# Patient Record
Sex: MALE | Race: OTHER | NOT HISPANIC OR LATINO | Employment: FULL TIME | URBAN - METROPOLITAN AREA
[De-identification: names, ages, dates, MRNs, and addresses within clinical notes are randomized per-mention and may not be internally consistent; named-entity substitution may affect disease eponyms.]

---

## 2023-06-12 ENCOUNTER — APPOINTMENT (OUTPATIENT)
Dept: URGENT CARE | Facility: MEDICAL CENTER | Age: 53
End: 2023-06-12

## 2023-06-26 ENCOUNTER — APPOINTMENT (OUTPATIENT)
Dept: URGENT CARE | Facility: MEDICAL CENTER | Age: 53
End: 2023-06-26

## 2023-06-27 ENCOUNTER — TELEPHONE (OUTPATIENT)
Dept: OBGYN CLINIC | Facility: MEDICAL CENTER | Age: 53
End: 2023-06-27

## 2023-06-27 NOTE — TELEPHONE ENCOUNTER
Caller: Miguel Lake at [facility]    Doctor: Dennis Torres    Reason for call:     Miguel Lake called to make appt, patient has a NP RT TENDON PROXIMAL HAM STRING TEAR W/C, as looking at   Appointment her call dropped  Dennis Torres has openings on 6/29 , when she calls back      Call back#: n/a

## 2023-06-28 ENCOUNTER — TELEPHONE (OUTPATIENT)
Dept: OBGYN CLINIC | Facility: CLINIC | Age: 53
End: 2023-06-28

## 2023-06-28 ENCOUNTER — OFFICE VISIT (OUTPATIENT)
Dept: OBGYN CLINIC | Facility: CLINIC | Age: 53
End: 2023-06-28
Payer: OTHER MISCELLANEOUS

## 2023-06-28 VITALS
DIASTOLIC BLOOD PRESSURE: 89 MMHG | HEIGHT: 70 IN | WEIGHT: 231.3 LBS | SYSTOLIC BLOOD PRESSURE: 125 MMHG | HEART RATE: 79 BPM | BODY MASS INDEX: 33.11 KG/M2

## 2023-06-28 DIAGNOSIS — S76.319A PARTIAL HAMSTRING TEAR, INITIAL ENCOUNTER: Primary | ICD-10-CM

## 2023-06-28 PROCEDURE — 99204 OFFICE O/P NEW MOD 45 MIN: CPT | Performed by: ORTHOPAEDIC SURGERY

## 2023-06-28 RX ORDER — DICLOFENAC SODIUM 75 MG/1
75 TABLET, DELAYED RELEASE ORAL
COMMUNITY
Start: 2023-06-12

## 2023-06-28 NOTE — TELEPHONE ENCOUNTER
Caller: Saurabh Villalta- Nurse     Doctor: Sharon Lange    Reason for call: Requesting OV note to be faxed over once it is completed and signed    Fax #: [phone]    Call back#: [phone]

## 2023-06-28 NOTE — PROGRESS NOTES
Ortho Sports Medicine New Patient Visit     Assesment:   47 y o  male with proximal hamstring tear     Plan:    Upon examination and review of the right lower leg MRI, we discussed that an isolated tear of 1 of the 3 hamstring tendons does not require surgical intervention  I recommend he start a course of physical therapy and referral was provided  The patient can also use ice/heat, Tylenol, or NSAIDs as needed for pain control  Given the patient's current pain level and being only 3 weeks out from his injury, the patient should be placed on work restrictions, including light duty with no lifting, pushing, pulling, or carrying greater than 5 pounds  A letter was provided with these restrictions today  Further work restrictions will be provided based on the patient's progression with PT as we continue to evaluate him at follow up visits  Follow up:    4 weeks for recheck following PT      Chief Complaint   Patient presents with   • Right Leg - Pain       History of Present Illness: The patient is a 47 y o  male presenting for right thigh pain following a traumatic injury at work 3 weeks ago  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, stating he underestimated the weight of the keg, which caused him to quickly flex at the level of the hip  The patient immediately felt and heard a painful popping sensation in the back of the mid-thigh  He did not note any bruising or muscle deformity following the injury   The       Knee/Hip Surgical History:  None    Past Medical, Social and Family History:  Past Medical History:   Diagnosis Date   • Hypertension      Past Surgical History:   Procedure Laterality Date   • HAND SURGERY Right 2001    plastic bone     No Known Allergies  Current Outpatient Medications on File Prior to Visit   Medication Sig Dispense Refill   • diclofenac (VOLTAREN) 75 mg EC tablet Take 75 mg by mouth 2 (two) times daily after meals (Patient not taking: "Reported on 6/28/2023)       No current facility-administered medications on file prior to visit  Social History     Socioeconomic History   • Marital status: Single     Spouse name: Not on file   • Number of children: Not on file   • Years of education: Not on file   • Highest education level: Not on file   Occupational History   • Not on file   Tobacco Use   • Smoking status: Never   • Smokeless tobacco: Never   Vaping Use   • Vaping Use: Never used   Substance and Sexual Activity   • Alcohol use: Never   • Drug use: Never   • Sexual activity: Not on file   Other Topics Concern   • Not on file   Social History Narrative   • Not on file     Social Determinants of Health     Financial Resource Strain: Not on file   Food Insecurity: Not on file   Transportation Needs: Not on file   Physical Activity: Not on file   Stress: Not on file   Social Connections: Not on file   Intimate Partner Violence: Not on file   Housing Stability: Not on file         I have reviewed the past medical, surgical, social and family history, medications and allergies as documented in the EMR  Review of systems: ROS is negative other than that noted in the HPI  Constitutional: Negative for fatigue and fever  HENT: Negative for sore throat  Respiratory: Negative for shortness of breath  Cardiovascular: Negative for chest pain  Gastrointestinal: Negative for abdominal pain  Endocrine: Negative for cold intolerance and heat intolerance  Genitourinary: Negative for flank pin  Musculoskeletal: Negative for back pain  Skin: Negative for rash  Allergic/Immunologic: Negative for immunocompromised state  Neurological: Negative for dizziness  Psychiatric/Behavioral: Negative for agitation  Physical Exam:    Blood pressure 125/89, pulse 79, height 5' 10\" (1 778 m), weight 105 kg (231 lb 4 8 oz)      General/Constitutional: NAD, well developed, well nourished  HENT: Normocephalic, atraumatic  CV: Intact distal pulses, " regular rate  Resp: No respiratory distress or labored breathing  Abdomen: soft, nondistended   Lymphatic: No lymphadenopathy palpated  Neuro: Alert and Oriented x 3, no focal deficits  Psych: Normal mood, normal affect  Skin: Warm, dry, no rashes, no erythema      Knee/Hip Exam:   No significant skin lesions, echhymosis or deformity  No changes in muscle bulk  Palpation: mild tenderness with deep palpation about 5 cm distal to the gluteal cleft   Knee range of motion from full extension to 130 without pain  5/5 knee extension and 4+/5 with resisted knee flexion with muscle cramping sensation  5/5 ankle DF/PF  Able to actively extend the hip and 5/5 hip extension   Neurovascularly intact distally    Imaging    MRI of the right lower leg reviewed from 6/17/2023, which demonstrates Grade 3 full-thickness biceps femoris tear retracted approximately 2 5 cm    Other 2 hamstring tendons remain intact

## 2023-06-28 NOTE — LETTER
June 28, 2023     Patient: Oma Thompson  YOB: 1970  Date of Visit: 6/28/2023      To Whom it May Concern:    Joe Cruz is under my professional care  Solange Jordan was seen in my office on 6/28/2023  Solange Jordan can work on [employer] duty only, including no lifting, pushing, pulling, or carrying greater than 5 pounds  The patient should attend physical therapy per a provided protocol, which includes ideally 1-3 sessions per week  Further work restrictions will be provided at his follow up visit in 4 weeks  If you have any questions or concerns, please don't hesitate to call           Sincerely,          Abbi Madrigal MD

## 2023-06-29 NOTE — TELEPHONE ENCOUNTER
Caller: Jesica Riddle - Nurse     Doctor: Isidra Donald    Reason for call: Calling to request Wang  to

## 2023-07-26 ENCOUNTER — OFFICE VISIT (OUTPATIENT)
Dept: OBGYN CLINIC | Facility: CLINIC | Age: 53
End: 2023-07-26
Payer: OTHER MISCELLANEOUS

## 2023-07-26 VITALS
HEIGHT: 70 IN | SYSTOLIC BLOOD PRESSURE: 124 MMHG | BODY MASS INDEX: 32.93 KG/M2 | DIASTOLIC BLOOD PRESSURE: 86 MMHG | WEIGHT: 230 LBS | HEART RATE: 70 BPM

## 2023-07-26 DIAGNOSIS — S76.319A PARTIAL HAMSTRING TEAR, INITIAL ENCOUNTER: Primary | ICD-10-CM

## 2023-07-26 PROCEDURE — 99213 OFFICE O/P EST LOW 20 MIN: CPT | Performed by: ORTHOPAEDIC SURGERY

## 2023-07-26 RX ORDER — LISINOPRIL 20 MG/1
20 TABLET ORAL DAILY
COMMUNITY

## 2023-07-26 RX ORDER — AMLODIPINE BESYLATE 5 MG/1
5 TABLET ORAL DAILY
COMMUNITY

## 2023-07-26 NOTE — PROGRESS NOTES
Ortho Sports Medicine New Patient Visit     Assesment:   47 y.o. male with proximal hamstring tear     Plan:    Upon examination today, I believe the patient continues to make appropriate symptomatic improvement with non-operative treatment, including consistent PT and OTC medications as needed. We discussed that rehab for hamstring tears should not progress too quickly to prevent delayed healing or worsening injury. As such, PT can continue progressing his hamstring strength and begin focusing on work hardening over the next 4 weeks. We can increase the patient's work restrictions to no lifting, pushing, pulling, or carrying greater than 20 pounds. A letter was provided with these restrictions today. Further work restrictions will be provided based on the patient's progression with PT as we continue to evaluate him at follow up visits. The patient can also use ice/heat, Tylenol, or NSAIDs as needed for pain control. Regarding his muscle cramps, this may be related to dehydration, electrolyte deficiencies, or muscle deconditioning among other things. We will continue to monitor these symptoms and I anticipate they will continue to improve as he regains strength in his leg. Follow up:    4 weeks for recheck following PT      Chief Complaint   Patient presents with   • Right Lower Leg - Follow-up       History of Present Illness: The patient is a 47 y.o. male presenting for follow up of right proximal hamstring tear following 1 month of consistent PT and supplementing with exercises at home. The injury occurred about 7 weeks ago. His  is present with him today. Carmenza Gonzales reports his pain continues to improve, but reports muscle cramping and "rusty horse" sensations in the posterior mid-thigh and calf following PT sessions and with certain motions, specifically squatting. Pain is alleviated by stretching and rest. The patient reports he stays well-hydrated.  The patient is using OTC medications as needed. 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 He does not note any bruising, muscle deformity, weakness, calf pain, or numbness/tingling. The patient has been out of work since his last visit. Knee/Hip Surgical History:  None    Past Medical, Social and Family History:  Past Medical History:   Diagnosis Date   • Hypertension      Past Surgical History:   Procedure Laterality Date   • HAND SURGERY Right 2001    plastic bone     No Known Allergies  Current Outpatient Medications on File Prior to Visit   Medication Sig Dispense Refill   • amLODIPine (NORVASC) 5 mg tablet Take 5 mg by mouth daily     • lisinopril (ZESTRIL) 20 mg tablet Take 20 mg by mouth daily     • diclofenac (VOLTAREN) 75 mg EC tablet Take 75 mg by mouth 2 (two) times daily after meals (Patient not taking: Reported on 6/28/2023)       No current facility-administered medications on file prior to visit. Social History     Socioeconomic History   • Marital status: Single     Spouse name: Not on file   • Number of children: Not on file   • Years of education: Not on file   • Highest education level: Not on file   Occupational History   • Not on file   Tobacco Use   • Smoking status: Never   • Smokeless tobacco: Never   Vaping Use   • Vaping Use: Never used   Substance and Sexual Activity   • Alcohol use: Never   • Drug use: Never   • Sexual activity: Not on file   Other Topics Concern   • Not on file   Social History Narrative   • Not on file     Social Determinants of Health     Financial Resource Strain: Not on file   Food Insecurity: Not on file   Transportation Needs: Not on file   Physical Activity: Not on file   Stress: Not on file   Social Connections: Not on file   Intimate Partner Violence: Not on file   Housing Stability: Not on file         I have reviewed the past medical, surgical, social and family history, medications and allergies as documented in the EMR.     Review of systems: ROS is negative other than that noted in the HPI. Constitutional: Negative for fatigue and fever. HENT: Negative for sore throat. Respiratory: Negative for shortness of breath. Cardiovascular: Negative for chest pain. Gastrointestinal: Negative for abdominal pain. Endocrine: Negative for cold intolerance and heat intolerance. Genitourinary: Negative for flank pin. Musculoskeletal: Negative for back pain. Skin: Negative for rash. Allergic/Immunologic: Negative for immunocompromised state. Neurological: Negative for dizziness. Psychiatric/Behavioral: Negative for agitation. Physical Exam:    Blood pressure 124/86, pulse 70, height 5' 10" (1.778 m), weight 104 kg (230 lb). General/Constitutional: NAD, well developed, well nourished  HENT: Normocephalic, atraumatic  CV: Intact distal pulses, regular rate  Resp: No respiratory distress or labored breathing  Abdomen: soft, nondistended   Lymphatic: No lymphadenopathy palpated  Neuro: Alert and Oriented x 3, no focal deficits  Psych: Normal mood, normal affect  Skin: Warm, dry, no rashes, no erythema      Knee/Hip Exam:   No significant skin lesions, ecchymosis, or deformity  No changes in muscle bulk  Palpation: mild tenderness with deep palpation about 6 inches distal to the gluteal cleft   Knee range of motion from full extension to 130 without pain  5/5 knee extension/flexion with muscle cramping sensation  5/5 ankle DF/PF  Able to actively extend the hip and 5/5 hip extension   Negative Homans  Neurovascularly intact distally    Imaging    MRI of the right lower leg reviewed from 6/17/2023, which demonstrates Grade 3 full-thickness biceps femoris tear retracted approximately 2.5 cm.  Other 2 hamstring tendons remain intact

## 2023-07-26 NOTE — LETTER
July 26, 2023     Patient: Malgorzata Jorge  YOB: 1970  Date of Visit: 7/26/2023      To Whom it May Concern:    Mellissa Ragsdale is under my professional care. Lv Grossman was seen in my office on 7/26/2023. Lv Grossman should continue light duty only with no lifting, carrying, pushing, or pulling greater than 20 pounds. Further work restrictions will be provided at his follow up visit with his Orthopedic Surgeon in 1 month. If you have any questions or concerns, please don't hesitate to call.          Sincerely,          Allyssa Francisco MD        CC: No Recipients

## 2023-07-28 ENCOUNTER — TELEPHONE (OUTPATIENT)
Dept: OBGYN CLINIC | Facility: HOSPITAL | Age: 53
End: 2023-07-28

## 2023-07-28 NOTE — TELEPHONE ENCOUNTER
Caller: [facility]    Doctor/Office: Raj HIGHTOWER#:       What needs to be faxed: OV 7/26/23    ATTN to: Jimmie Jaimes.     Fax#: [phone]      Documents were successfully e-faxed

## 2023-09-05 ENCOUNTER — HOSPITAL ENCOUNTER (EMERGENCY)
Facility: HOSPITAL | Age: 53
Discharge: HOME | End: 2023-09-05
Attending: EMERGENCY MEDICINE
Payer: COMMERCIAL

## 2023-09-05 VITALS
DIASTOLIC BLOOD PRESSURE: 94 MMHG | SYSTOLIC BLOOD PRESSURE: 154 MMHG | HEART RATE: 69 BPM | RESPIRATION RATE: 18 BRPM | TEMPERATURE: 98.4 F | OXYGEN SATURATION: 96 %

## 2023-09-05 DIAGNOSIS — E11.9 NEW ONSET TYPE 2 DIABETES MELLITUS (HCC): Primary | ICD-10-CM

## 2023-09-05 LAB
ANION GAP SERPL CALCULATED.3IONS-SCNC: 10 MMOL/L
BASOPHILS # BLD AUTO: 0.07 THOUSANDS/ÂΜL (ref 0–0.1)
BASOPHILS NFR BLD AUTO: 2 % (ref 0–1)
BUN SERPL-MCNC: 13 MG/DL (ref 5–25)
CALCIUM SERPL-MCNC: 9.7 MG/DL (ref 8.4–10.2)
CHLORIDE SERPL-SCNC: 102 MMOL/L (ref 96–108)
CO2 SERPL-SCNC: 24 MMOL/L (ref 21–32)
CREAT SERPL-MCNC: 1.34 MG/DL (ref 0.6–1.3)
EOSINOPHIL # BLD AUTO: 0.24 THOUSAND/ÂΜL (ref 0–0.61)
EOSINOPHIL NFR BLD AUTO: 5 % (ref 0–6)
ERYTHROCYTE [DISTWIDTH] IN BLOOD BY AUTOMATED COUNT: 14.9 % (ref 11.6–15.1)
GFR SERPL CREATININE-BSD FRML MDRD: 60 ML/MIN/1.73SQ M
GLUCOSE SERPL-MCNC: 242 MG/DL (ref 65–140)
HCT VFR BLD AUTO: 45.4 % (ref 36.5–49.3)
HGB BLD-MCNC: 14.8 G/DL (ref 12–17)
IMM GRANULOCYTES # BLD AUTO: 0.01 THOUSAND/UL (ref 0–0.2)
IMM GRANULOCYTES NFR BLD AUTO: 0 % (ref 0–2)
LYMPHOCYTES # BLD AUTO: 1.97 THOUSANDS/ÂΜL (ref 0.6–4.47)
LYMPHOCYTES NFR BLD AUTO: 42 % (ref 14–44)
MCH RBC QN AUTO: 24.5 PG (ref 26.8–34.3)
MCHC RBC AUTO-ENTMCNC: 32.6 G/DL (ref 31.4–37.4)
MCV RBC AUTO: 75 FL (ref 82–98)
MONOCYTES # BLD AUTO: 0.39 THOUSAND/ÂΜL (ref 0.17–1.22)
MONOCYTES NFR BLD AUTO: 8 % (ref 4–12)
NEUTROPHILS # BLD AUTO: 2.04 THOUSANDS/ÂΜL (ref 1.85–7.62)
NEUTS SEG NFR BLD AUTO: 43 % (ref 43–75)
NRBC BLD AUTO-RTO: 0 /100 WBCS
PLATELET # BLD AUTO: 258 THOUSANDS/UL (ref 149–390)
PMV BLD AUTO: 10.1 FL (ref 8.9–12.7)
POTASSIUM SERPL-SCNC: 4 MMOL/L (ref 3.5–5.3)
RBC # BLD AUTO: 6.04 MILLION/UL (ref 3.88–5.62)
SODIUM SERPL-SCNC: 136 MMOL/L (ref 135–147)
WBC # BLD AUTO: 4.72 THOUSAND/UL (ref 4.31–10.16)

## 2023-09-05 PROCEDURE — 99284 EMERGENCY DEPT VISIT MOD MDM: CPT | Performed by: EMERGENCY MEDICINE

## 2023-09-05 PROCEDURE — 80048 BASIC METABOLIC PNL TOTAL CA: CPT | Performed by: EMERGENCY MEDICINE

## 2023-09-05 PROCEDURE — 36415 COLL VENOUS BLD VENIPUNCTURE: CPT | Performed by: EMERGENCY MEDICINE

## 2023-09-05 PROCEDURE — 83036 HEMOGLOBIN GLYCOSYLATED A1C: CPT | Performed by: EMERGENCY MEDICINE

## 2023-09-05 PROCEDURE — 85025 COMPLETE CBC W/AUTO DIFF WBC: CPT | Performed by: EMERGENCY MEDICINE

## 2023-09-05 PROCEDURE — 99283 EMERGENCY DEPT VISIT LOW MDM: CPT

## 2023-09-05 NOTE — ED PROVIDER NOTES
History  Chief Complaint   Patient presents with   • Abnormal Lab     Patient sent in for lab screening      20-year-old male with history of hypertension on amlodipine and lisinopril presents for lab screening. 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 Patient has no previous diagnosis of diabetes. He was sent for further testing to determine if he is diabetic in order to complete his DOT license renewal.  Patient denies any acute symptoms. He has been feeling well recently. He has been taking his blood pressure medications as prescribed. He needs a new PCP to follow-up on his chronic medical screenings and was provided with a list of options. Prior to Admission Medications   Prescriptions Last Dose Informant Patient Reported? Taking? amLODIPine (NORVASC) 5 mg tablet   Yes No   Sig: Take 5 mg by mouth daily   diclofenac (VOLTAREN) 75 mg EC tablet   Yes No   Sig: Take 75 mg by mouth 2 (two) times daily after meals   Patient not taking: Reported on 6/28/2023   lisinopril (ZESTRIL) 20 mg tablet   Yes No   Sig: Take 20 mg by mouth daily      Facility-Administered Medications: None       Past Medical History:   Diagnosis Date   • Hypertension        Past Surgical History:   Procedure Laterality Date   • HAND SURGERY Right 2001    plastic bone       History reviewed. No pertinent family history. I have reviewed and agree with the history as documented. E-Cigarette/Vaping   • E-Cigarette Use Never User      E-Cigarette/Vaping Substances   • Nicotine No    • THC No    • CBD No    • Flavoring No    • Other No    • Unknown No      Social History     Tobacco Use   • Smoking status: Never   • Smokeless tobacco: Never   Vaping Use   • Vaping Use: Never used   Substance Use Topics   • Alcohol use: Never   • Drug use: Never       Review of Systems   All other systems reviewed and are negative.       Physical Exam  Physical Exam  Constitutional:       General: He is not in acute distress. Appearance: Normal appearance. HENT:      Head: Normocephalic. Mouth/Throat:      Mouth: Mucous membranes are moist.   Eyes:      Conjunctiva/sclera: Conjunctivae normal.   Cardiovascular:      Rate and Rhythm: Normal rate. Pulmonary:      Effort: Pulmonary effort is normal.   Musculoskeletal:         General: Normal range of motion. Cervical back: Normal range of motion. Skin:     General: Skin is dry. Neurological:      General: No focal deficit present. Mental Status: He is alert. Psychiatric:         Mood and Affect: Mood normal.         Behavior: Behavior normal.         Vital Signs  ED Triage Vitals [09/05/23 1541]   Temperature Pulse Respirations Blood Pressure SpO2   98.4 °F (36.9 °C) 69 18 154/94 96 %      Temp Source Heart Rate Source Patient Position - Orthostatic VS BP Location FiO2 (%)   Oral Monitor Sitting Left arm --      Pain Score       --           Vitals:    09/05/23 1541   BP: 154/94   Pulse: 69   Patient Position - Orthostatic VS: Sitting         Visual Acuity      ED Medications  Medications - No data to display    Diagnostic Studies  Results Reviewed     Procedure Component Value Units Date/Time    CBC and differential [[id number]] Collected: 09/05/23 1602    Lab Status: No result Specimen: Blood from Arm, Left     Basic metabolic panel [[id number]] Collected: 09/05/23 1602    Lab Status: No result Specimen: Blood from Arm, Left     Hemoglobin A1C [[id number]] Collected: 09/05/23 1602    Lab Status: No result Specimen: Blood from Arm, Left                  No orders to display              Procedures  Procedures         ED Course       60-year-old male presenting for evaluation of glycosuria on DOT physical.  He is well-appearing with normal vitals.   Will obtain CBC BMP and hemoglobin A1c to determine whether patient may have developed diabetes which is currently asymptomatic. SBIRT 20yo+    Flowsheet Row Most Recent Value   Initial Alcohol Screen: US AUDIT-C     1. How often do you have a drink containing alcohol? 0 Filed at: 09/05/2023 1547   2. How many drinks containing alcohol do you have on a typical day you are drinking? 0 Filed at: 09/05/2023 1547   3a. Male UNDER 65: How often do you have five or more drinks on one occasion? 0 Filed at: 09/05/2023 1547   3b. FEMALE Any Age, or MALE 65+: How often do you have 4 or more drinks on one occassion? 0 Filed at: 09/05/2023 1547   Audit-C Score 0 Filed at: 09/05/2023 1547   DAVE: How many times in the past year have you. .. Used an illegal drug or used a prescription medication for non-medical reasons? Never Filed at: 09/05/2023 1547                    MDM    Disposition  Final diagnoses:   None     ED Disposition     None      Follow-up Information    None         Patient's Medications   Discharge Prescriptions    No medications on file       No discharge procedures on file.     PDMP Review     None          ED Provider  Electronically Signed by           Francie Moise MD  09/06/23 5350

## 2023-09-05 NOTE — ED RE-EVALUATION NOTE
I received sign-out on the patient at change of shift. 1. New onset type 2 diabetes mellitus [facility])      ED Course as of 09/05/23 2321   Tue Sep 05, 2023   1658 Case signed out to me at change of shift pending A1C     1812 Patient not willing to wait for A1C but does have glucosuria and random glucose of 242. Will start metformin while awaiting PCP follow-up.          Disposition: Discharged in stable condition       Vicenta Hicks MD  09/05/23 6663

## 2023-09-06 LAB
EST. AVERAGE GLUCOSE BLD GHB EST-MCNC: 169 MG/DL
HBA1C MFR BLD: 7.5 %

## 2023-09-08 ENCOUNTER — TELEPHONE (OUTPATIENT)
Age: 53
End: 2023-09-08

## 2023-09-08 NOTE — TELEPHONE ENCOUNTER
Caller: Rebeca-    Doctor: Dr. Lindsey Araiza    Reason for call: Rebeca-nurse  asking if there is anyway patient can be squeezed in this afternoon. She is outside the office and was to be advised of any change in appt status. Advised Dr. Lindsey Araiza fully booked. Called office and they were able to squeeze patient in at 2:15PM today.  had me call to leave message with patient. VM left.  asking if it was possible to reserve 2:15PM on 9/8/23 and 9/11/23 appt at 5:45PM advised that we cannot hold the 2 appts.   Per past schedule, patient cancelled the 9/8/23 appt on 9/7/23 at 3:16PM.       Call back#: [phone]

## 2023-09-11 ENCOUNTER — OFFICE VISIT (OUTPATIENT)
Dept: OBGYN CLINIC | Facility: CLINIC | Age: 53
End: 2023-09-11
Payer: OTHER MISCELLANEOUS

## 2023-09-11 VITALS
WEIGHT: 227 LBS | DIASTOLIC BLOOD PRESSURE: 81 MMHG | HEIGHT: 70 IN | HEART RATE: 71 BPM | SYSTOLIC BLOOD PRESSURE: 122 MMHG | BODY MASS INDEX: 32.5 KG/M2

## 2023-09-11 DIAGNOSIS — S76.311S PARTIAL HAMSTRING TEAR, RIGHT, SEQUELA: Primary | ICD-10-CM

## 2023-09-11 PROCEDURE — 99212 OFFICE O/P EST SF 10 MIN: CPT | Performed by: ORTHOPAEDIC SURGERY

## 2023-09-11 NOTE — LETTER
September 11, 2023     Patient: Bharathi Mehta  YOB: 1970  Date of Visit: 9/11/2023      To Whom it May Concern:    Iqra Rodriguez is under my professional care. Talavera Bosworth was seen in my office on 9/11/2023. Fletcher Bosworth may return to full duty without any limitations. If you have any questions or concerns, please don't hesitate to call.          Sincerely,          Nataliia Dennis MD        CC: No Recipients

## 2023-09-11 NOTE — PROGRESS NOTES
Ortho Sports Medicine New Patient Visit     Assesment:   47 y.o. male with right proximal hamstring tear     Plan:    Upon examination today, I believe the patient has made significant progress with non-operative treatment and is clear to return to full duty and activity as tolerated without any specific limitations at this time. The patient can continue using ice/heat, Tylenol, or NSAIDs as needed. He can plan to follow up as needed. A letter was provided for full duty today. Follow up as needed in the future. History of Present Illness: The patient is a 47 y.o. male presenting for follow up of right proximal hamstring tear about 13.5 weeks ago. He has done an additional 1.5 months of consistent PT and performing home exercises. He states the work hardening helped his progress and he has returned to working out on his own without any difficulties. Harvinder Gray denies any pain, muscle deformity, weakness, or numbness/tingling. He started a new job as a  and has been working without any issues. Knee/Hip Surgical History:  None    Past Medical, Social and Family History:  Past Medical History:   Diagnosis Date   • Hypertension      Past Surgical History:   Procedure Laterality Date   • HAND SURGERY Right 2001    plastic bone     No Known Allergies  Current Outpatient Medications on File Prior to Visit   Medication Sig Dispense Refill   • amLODIPine (NORVASC) 5 mg tablet Take 5 mg by mouth daily     • lisinopril (ZESTRIL) 20 mg tablet Take 20 mg by mouth daily     • metFORMIN (GLUCOPHAGE) 500 mg tablet Take 1 tablet (500 mg total) by mouth 2 (two) times a day with meals for 14 days 28 tablet 0   • diclofenac (VOLTAREN) 75 mg EC tablet Take 75 mg by mouth 2 (two) times daily after meals (Patient not taking: Reported on 6/28/2023)       No current facility-administered medications on file prior to visit.      Social History     Socioeconomic History   • Marital status: Single     Spouse name: Not on file   • Number of children: Not on file   • Years of education: Not on file   • Highest education level: Not on file   Occupational History   • Not on file   Tobacco Use   • Smoking status: Never   • Smokeless tobacco: Never   Vaping Use   • Vaping Use: Never used   Substance and Sexual Activity   • Alcohol use: Never   • Drug use: Never   • Sexual activity: Not on file   Other Topics Concern   • Not on file   Social History Narrative   • Not on file     Social Determinants of Health     Financial Resource Strain: Not on file   Food Insecurity: Not on file   Transportation Needs: Not on file   Physical Activity: Not on file   Stress: Not on file   Social Connections: Not on file   Intimate Partner Violence: Not on file   Housing Stability: Not on file         I have reviewed the past medical, surgical, social and family history, medications and allergies as documented in the EMR. Review of systems: ROS is negative other than that noted in the HPI. Constitutional: Negative for fatigue and fever. HENT: Negative for sore throat. Respiratory: Negative for shortness of breath. Cardiovascular: Negative for chest pain. Gastrointestinal: Negative for abdominal pain. Endocrine: Negative for cold intolerance and heat intolerance. Genitourinary: Negative for flank pin. Musculoskeletal: Negative for back pain. Skin: Negative for rash. Allergic/Immunologic: Negative for immunocompromised state. Neurological: Negative for dizziness. Psychiatric/Behavioral: Negative for agitation. Physical Exam:    Blood pressure 122/81, pulse 71, height 5' 10" (1.778 m), weight 103 kg (227 lb).     General/Constitutional: NAD, well developed, well nourished  HENT: Normocephalic, atraumatic  CV: Intact distal pulses, regular rate  Resp: No respiratory distress or labored breathing  Abdomen: soft, nondistended   Lymphatic: No lymphadenopathy palpated  Neuro: Alert and Oriented x 3, no focal deficits  Psych: Normal mood, normal affect  Skin: Warm, dry, no rashes, no erythema      Knee/Hip Exam:   No significant skin lesions, ecchymosis, or deformity  No changes in muscle bulk  Palpation: no tenderness with deep palpation along the hamstrings  Knee range of motion from full extension to 130 without pain  5/5 knee extension/flexion with muscle cramping sensation  Able to actively extend the hip and 5/5 hip extension   Able to deep squat without any pain or limitations  Neurovascularly intact distally    Imaging    MRI of the right lower leg reviewed from 6/17/2023, which demonstrates Grade 3 full-thickness biceps femoris tear retracted approximately 2.5 cm. Other 2 hamstring tendons remain intact.

## 2023-09-12 ENCOUNTER — TELEPHONE (OUTPATIENT)
Age: 53
End: 2023-09-12

## 2023-09-12 NOTE — TELEPHONE ENCOUNTER
Caller: [facility] w/ Work Comp     Doctor: Thien Araiza     Reason for call: Please fax  office note to her once signed to : [phone].      Call back#: [phone]

## 2023-12-14 ENCOUNTER — OFFICE VISIT (OUTPATIENT)
Dept: INTERNAL MEDICINE CLINIC | Facility: CLINIC | Age: 53
End: 2023-12-14
Payer: COMMERCIAL

## 2023-12-14 VITALS
HEIGHT: 71 IN | TEMPERATURE: 97.4 F | BODY MASS INDEX: 32.62 KG/M2 | HEART RATE: 91 BPM | WEIGHT: 233 LBS | OXYGEN SATURATION: 99 % | RESPIRATION RATE: 20 BRPM | SYSTOLIC BLOOD PRESSURE: 120 MMHG | DIASTOLIC BLOOD PRESSURE: 92 MMHG

## 2023-12-14 DIAGNOSIS — Z76.89 ENCOUNTER TO ESTABLISH CARE: Primary | ICD-10-CM

## 2023-12-14 DIAGNOSIS — E11.9 TYPE 2 DIABETES MELLITUS WITHOUT COMPLICATION, WITHOUT LONG-TERM CURRENT USE OF INSULIN (HCC): ICD-10-CM

## 2023-12-14 DIAGNOSIS — I10 HYPERTENSION, UNSPECIFIED TYPE: ICD-10-CM

## 2023-12-14 PROCEDURE — 99203 OFFICE O/P NEW LOW 30 MIN: CPT

## 2023-12-14 NOTE — ASSESSMENT & PLAN NOTE
Blood Pressure: 120/92   Maintained on lisinopril 20 mg and amlodipine 5 mg daily  Exercise and low salt diet emphasized

## 2023-12-14 NOTE — ASSESSMENT & PLAN NOTE
Lab Results   Component Value Date    HGBA1C 7.5 (H) 09/05/2023   Previously on short course of metformin 500 mg bid  Dose increased to 1000 mg bid this visit  Diabetic foot exam wnl  Patient educated about diabetic diet and exercise

## 2023-12-14 NOTE — PROGRESS NOTES
Name: Bravo Bragg      : 1970      MRN: [id number]  Encounter Provider: Olivia Coffey MD  Encounter Date: 2023   Encounter department: [address] [place] [address]     1. Encounter to establish care  -     Albumin / creatinine urine ratio; Future; Expected date: 2023  -     Comprehensive metabolic panel; Future; Expected date: 2023  -     Hemoglobin A1C; Future; Expected date: 2023  -     TSH, 3rd generation with Free T4 reflex; Future  -     Ambulatory Referral to Gastroenterology; Future  -     PSA Total (Reflex To Free); Future  -     Lipid Panel with Direct LDL reflex; Future  -     Ambulatory Referral to Ophthalmology; Future  -     metFORMIN (GLUCOPHAGE) 1000 MG tablet; Take 1 tablet (1,000 mg total) by mouth 2 (two) times a day with meals    2. Type 2 diabetes mellitus without complication, without long-term current use of insulin (HCC)  Assessment & Plan:    Lab Results   Component Value Date    HGBA1C 7.5 (H) 2023   Previously on short course of metformin 500 mg bid  Dose increased to 1000 mg bid this visit  Diabetic foot exam wnl  Patient educated about diabetic diet and exercise      Orders:  -     Albumin / creatinine urine ratio; Future; Expected date: 2023  -     Comprehensive metabolic panel; Future; Expected date: 2023  -     Hemoglobin A1C; Future; Expected date: 2023  -     Lipid Panel with Direct LDL reflex; Future  -     Ambulatory Referral to Ophthalmology; Future  -     metFORMIN (GLUCOPHAGE) 1000 MG tablet; Take 1 tablet (1,000 mg total) by mouth 2 (two) times a day with meals    3. Hypertension, unspecified type  Assessment & Plan:  Blood Pressure: 120/92   Maintained on lisinopril 20 mg and amlodipine 5 mg daily  Exercise and low salt diet emphasized    Orders:  -     Comprehensive metabolic panel;  Future; Expected date: 2023        Depression Screening and Follow-up Plan: Patient was screened for depression during today's encounter. They screened negative with a PHQ-2 score of 0. Subjective      Patient is a 47year old male with a prior history of hypertension and type II diabetes. He is here to establish care with a PCP today for further management of his chronic issues. He is a  and is seeking to get his certification renewed. Social history is negative for tobacco, alcohol or illicit drug use. There is no prior family history of heart disease, stroke, or cancer. He is  and sexually active with his wife. He served a 5 year sentence in [employer] and was released just over a year ago. He was screened for hep c and HIV at that time which he reports was negative. Patient reports being up to date on tetanus and covid shots but is declining the flu vaccine at this visit. Patient recently had A1c done in emergency department which showed him to be in diabetic range. He was prescribed a short course of low dose metformin at that time but stopped taking it after the prescription wore off. He did not have a PCP at that time and is currently in between insurance policies. Patient is aware of his diabetes diagnosis and tries hard to take care of himself. He exercises regularly and he says his wife cooks for him. Patient endorses eating steamed vegetables often with his meals. Patient endorses feeling generally well today and has no complaints at this visit. Review of Systems   Constitutional:  Negative for chills and fever. HENT:  Negative for congestion, hearing loss, rhinorrhea, sore throat and trouble swallowing. Eyes:  Negative for pain and visual disturbance. Respiratory:  Negative for cough and shortness of breath. Cardiovascular:  Negative for chest pain, palpitations and leg swelling. Gastrointestinal:  Negative for abdominal distention, abdominal pain, blood in stool, constipation, diarrhea, nausea and vomiting.    Genitourinary:  Negative for difficulty urinating, dysuria and frequency. Musculoskeletal:  Negative for back pain and neck pain. Skin:  Negative for color change and rash. Neurological:  Negative for dizziness, speech difficulty, weakness, numbness and headaches. Psychiatric/Behavioral:  Negative for agitation, behavioral problems and dysphoric mood. The patient is not nervous/anxious. Current Outpatient Medications on File Prior to Visit   Medication Sig    amLODIPine (NORVASC) 5 mg tablet Take 5 mg by mouth daily (Patient not taking: Reported on 12/14/2023)    diclofenac (VOLTAREN) 75 mg EC tablet Take 75 mg by mouth 2 (two) times daily after meals (Patient not taking: Reported on 6/28/2023)    lisinopril (ZESTRIL) 20 mg tablet Take 20 mg by mouth daily (Patient not taking: Reported on 12/14/2023)    [DISCONTINUED] metFORMIN (GLUCOPHAGE) 500 mg tablet Take 1 tablet (500 mg total) by mouth 2 (two) times a day with meals for 14 days (Patient not taking: Reported on 12/14/2023)       Objective     /92 (BP Location: Left arm, Patient Position: Sitting, Cuff Size: Large)   Pulse 91   Temp (!) 97.4 °F (36.3 °C)   Resp 20   Ht 5' 10.5" (1.791 m)   Wt 106 kg (233 lb)   SpO2 99%   BMI 32.96 kg/m²     Physical Exam  Vitals and nursing note reviewed. Constitutional:       General: He is not in acute distress. Appearance: Normal appearance. He is not ill-appearing. HENT:      Head: Normocephalic and atraumatic. Nose: Nose normal.      Mouth/Throat:      Mouth: Mucous membranes are moist.      Pharynx: Oropharynx is clear. No oropharyngeal exudate. Eyes:      General: No scleral icterus. Right eye: No discharge. Conjunctiva/sclera: Conjunctivae normal.      Pupils: Pupils are equal, round, and reactive to light. Cardiovascular:      Rate and Rhythm: Normal rate and regular rhythm. Pulses: Normal pulses.  no weak pulses          Dorsalis pedis pulses are 2+ on the right side and 2+ on the left side.      Heart sounds: Normal heart sounds. Pulmonary:      Effort: Pulmonary effort is normal.      Breath sounds: Normal breath sounds. Abdominal:      General: Abdomen is flat. Bowel sounds are normal.      Palpations: Abdomen is soft. Musculoskeletal:         General: Normal range of motion. Cervical back: Normal range of motion. Right lower leg: No edema. Left lower leg: No edema. Feet:      Right foot:      Skin integrity: No ulcer, skin breakdown, erythema, warmth, callus or dry skin. Left foot:      Skin integrity: No ulcer, skin breakdown, erythema, warmth, callus or dry skin. Skin:     General: Skin is warm and dry. Neurological:      General: No focal deficit present. Mental Status: He is alert and oriented to person, place, and time. Cranial Nerves: No cranial nerve deficit. Psychiatric:         Mood and Affect: Mood normal.         Behavior: Behavior normal.     Diabetic Foot Exam    Patient's shoes and socks removed. Right Foot/Ankle   Right Foot Inspection  Skin Exam: skin normal. Skin not intact, no dry skin, no warmth, no callus, no erythema, no maceration, no abnormal color, no pre-ulcer, no ulcer and no callus. Toe Exam: ROM and strength within normal limits. No swelling, no tenderness, erythema and  no right toe deformity    Sensory   Monofilament testing: intact    Vascular  Capillary refills: < 3 seconds  The right DP pulse is 2+. Left Foot/Ankle  Left Foot Inspection  Skin Exam: skin normal. Skin not intact, no dry skin, no warmth, no erythema, no maceration, normal color, no pre-ulcer, no ulcer and no callus. Toe Exam: ROM and strength within normal limits. No swelling, no tenderness, no erythema and no left toe deformity. Sensory   Monofilament testing: intact    Vascular  Capillary refills: < 3 seconds  The left DP pulse is 2+.      Assign Risk Category  No deformity present  No loss of protective sensation  No weak pulses  Risk: 0    Hernán Zapien MD

## 2023-12-15 ENCOUNTER — APPOINTMENT (OUTPATIENT)
Dept: LAB | Facility: HOSPITAL | Age: 53
End: 2023-12-15

## 2023-12-15 DIAGNOSIS — E11.9 TYPE 2 DIABETES MELLITUS WITHOUT COMPLICATION, WITHOUT LONG-TERM CURRENT USE OF INSULIN (HCC): Primary | ICD-10-CM

## 2023-12-15 DIAGNOSIS — E11.9 TYPE 2 DIABETES MELLITUS WITHOUT COMPLICATION, WITHOUT LONG-TERM CURRENT USE OF INSULIN (HCC): ICD-10-CM

## 2023-12-15 DIAGNOSIS — Z76.89 ENCOUNTER TO ESTABLISH CARE: ICD-10-CM

## 2023-12-15 LAB
EST. AVERAGE GLUCOSE BLD GHB EST-MCNC: 209 MG/DL
HBA1C MFR BLD: 8.9 %

## 2023-12-15 PROCEDURE — 83036 HEMOGLOBIN GLYCOSYLATED A1C: CPT

## 2023-12-15 PROCEDURE — 36415 COLL VENOUS BLD VENIPUNCTURE: CPT

## 2023-12-19 ENCOUNTER — TELEPHONE (OUTPATIENT)
Dept: INTERNAL MEDICINE CLINIC | Facility: CLINIC | Age: 53
End: 2023-12-19

## 2023-12-19 NOTE — TELEPHONE ENCOUNTER
Patient was not able to afford new medication ordered. Will continue the Metformin unless advised differently or other less expensive med is ordered.

## 2023-12-19 NOTE — TELEPHONE ENCOUNTER
Patient has metformin and he scheduled an appointment in January since he is going to have insurance but we will need to verify his insurance is accepted by our office

## 2024-01-25 ENCOUNTER — OFFICE VISIT (OUTPATIENT)
Dept: INTERNAL MEDICINE CLINIC | Facility: CLINIC | Age: 54
End: 2024-01-25
Payer: COMMERCIAL

## 2024-01-25 VITALS
TEMPERATURE: 98 F | SYSTOLIC BLOOD PRESSURE: 140 MMHG | BODY MASS INDEX: 33.79 KG/M2 | DIASTOLIC BLOOD PRESSURE: 88 MMHG | HEIGHT: 70 IN | HEART RATE: 73 BPM | OXYGEN SATURATION: 97 % | RESPIRATION RATE: 14 BRPM | WEIGHT: 236 LBS

## 2024-01-25 DIAGNOSIS — E11.9 TYPE 2 DIABETES MELLITUS WITHOUT COMPLICATION, WITHOUT LONG-TERM CURRENT USE OF INSULIN (HCC): Primary | ICD-10-CM

## 2024-01-25 DIAGNOSIS — I10 HYPERTENSION, UNSPECIFIED TYPE: ICD-10-CM

## 2024-01-25 PROCEDURE — 99213 OFFICE O/P EST LOW 20 MIN: CPT

## 2024-01-25 RX ORDER — AMLODIPINE BESYLATE 5 MG/1
5 TABLET ORAL DAILY
Qty: 30 TABLET | Refills: 2 | Status: SHIPPED | OUTPATIENT
Start: 2024-01-25

## 2024-01-25 RX ORDER — GLIPIZIDE 5 MG/1
5 TABLET ORAL
Qty: 30 TABLET | Refills: 2 | Status: SHIPPED | OUTPATIENT
Start: 2024-01-25

## 2024-01-25 RX ORDER — LISINOPRIL 20 MG/1
20 TABLET ORAL DAILY
Qty: 30 TABLET | Refills: 2 | Status: SHIPPED | OUTPATIENT
Start: 2024-01-25

## 2024-01-26 NOTE — ASSESSMENT & PLAN NOTE
Blood Pressure: 140/88   Patient has been out of medications for last month due to lack of insurance coverage  Now insured, refills of lisinopril and amlodipine sent

## 2024-01-26 NOTE — PROGRESS NOTES
Name: John Uriarte      : 1970      MRN: [id number]  Encounter Provider: Jose R Painting MD  Encounter Date: 2024   Encounter department: [facility] INTERNAL MEDICINE [place]    Assessment & Plan     1. Type 2 diabetes mellitus without complication, without long-term current use of insulin ([facility])  Assessment & Plan:    Lab Results   Component Value Date    HGBA1C 8.9 (H) 12/15/2023     Tried prescribing Janumet at last visit however too expensive on patient's insurance plan  Will continue metformin 500 mg BID, add glipizide 5 mg daily  Continue weight loss diabetic diet and increased physical activity   RTC in 3 months    Orders:  -     metFORMIN (GLUCOPHAGE) 500 mg tablet; Take 1 tablet (500 mg total) by mouth 2 (two) times a day with meals  -     glipiZIDE (GLUCOTROL) 5 mg tablet; Take 1 tablet (5 mg total) by mouth daily with breakfast    2. Hypertension, unspecified type  Assessment & Plan:  Blood Pressure: 140/88   Patient has been out of medications for last month due to lack of insurance coverage  Now insured, refills of lisinopril and amlodipine sent    Orders:  -     amLODIPine (NORVASC) 5 mg tablet; Take 1 tablet (5 mg total) by mouth daily  -     lisinopril (ZESTRIL) 20 mg tablet; Take 1 tablet (20 mg total) by mouth daily           Subjective      Patient is a 53 year old male with HTN and diabetes presenting to discuss cost of medications and request refills.  He was seen a month ago and at that time we had tried to start him on Janumet.  He recently started a job as a [employer] and now has insurance but even still the medication is too expensive for him.  Without insurance, he also has not been taking his antihypertensive medications but now is requesting refills.        Review of Systems   Constitutional:  Negative for chills and fever.   HENT:  Negative for ear pain and sore throat.    Eyes:  Negative for pain and visual disturbance.   Respiratory:  Negative for cough and  "shortness of breath.    Cardiovascular:  Negative for chest pain and palpitations.   Gastrointestinal:  Negative for abdominal pain and vomiting.   Genitourinary:  Negative for dysuria and hematuria.   Musculoskeletal:  Negative for arthralgias and back pain.   Skin:  Negative for color change and rash.   Neurological:  Negative for seizures and syncope.   All other systems reviewed and are negative.      Current Outpatient Medications on File Prior to Visit   Medication Sig    diclofenac (VOLTAREN) 75 mg EC tablet Take 75 mg by mouth 2 (two) times daily after meals       Objective     /88 (BP Location: Left arm, Patient Position: Sitting, Cuff Size: Large)   Pulse 73   Temp 98 °F (36.7 °C) (Tympanic)   Resp 14   Ht 5' 10\" (1.778 m)   Wt 107 kg (236 lb)   SpO2 97%   BMI 33.86 kg/m²     Physical Exam  Vitals and nursing note reviewed.   Constitutional:       Appearance: Normal appearance. He is not ill-appearing.   HENT:      Head: Normocephalic and atraumatic.      Mouth/Throat:      Mouth: Mucous membranes are moist.      Pharynx: Oropharynx is clear. No oropharyngeal exudate.   Eyes:      General: No scleral icterus.     Extraocular Movements: Extraocular movements intact.      Conjunctiva/sclera: Conjunctivae normal.      Pupils: Pupils are equal, round, and reactive to light.   Cardiovascular:      Rate and Rhythm: Normal rate and regular rhythm.      Pulses: Normal pulses.      Heart sounds: Normal heart sounds.   Pulmonary:      Effort: Pulmonary effort is normal. No respiratory distress.      Breath sounds: Normal breath sounds. No wheezing, rhonchi or rales.   Abdominal:      General: Abdomen is flat. Bowel sounds are normal. There is no distension.      Palpations: Abdomen is soft.      Tenderness: There is no abdominal tenderness.   Musculoskeletal:         General: Normal range of motion.      Cervical back: Normal range of motion.      Right lower leg: No edema.      Left lower leg: No " edema.   Skin:     General: Skin is warm.      Findings: No rash.   Neurological:      General: No focal deficit present.      Mental Status: He is alert and oriented to person, place, and time. Mental status is at baseline.      Cranial Nerves: No cranial nerve deficit.      Sensory: No sensory deficit.      Motor: No weakness.   Psychiatric:         Mood and Affect: Mood normal.         Behavior: Behavior normal.       Jose R Painting MD

## 2024-01-26 NOTE — ASSESSMENT & PLAN NOTE
Lab Results   Component Value Date    HGBA1C 8.9 (H) 12/15/2023     Tried prescribing Janumet at last visit however too expensive on patient's insurance plan  Will continue metformin 500 mg BID, add glipizide 5 mg daily  Continue weight loss diabetic diet and increased physical activity   RTC in 3 months

## 2024-04-04 ENCOUNTER — OFFICE VISIT (OUTPATIENT)
Dept: INTERNAL MEDICINE CLINIC | Facility: CLINIC | Age: 54
End: 2024-04-04
Payer: COMMERCIAL

## 2024-04-04 VITALS
HEIGHT: 69 IN | DIASTOLIC BLOOD PRESSURE: 80 MMHG | RESPIRATION RATE: 14 BRPM | OXYGEN SATURATION: 98 % | SYSTOLIC BLOOD PRESSURE: 120 MMHG | WEIGHT: 230 LBS | HEART RATE: 67 BPM | BODY MASS INDEX: 34.07 KG/M2 | TEMPERATURE: 98.2 F

## 2024-04-04 DIAGNOSIS — Z00.00 ENCOUNTER FOR PHYSICAL EXAMINATION: Primary | ICD-10-CM

## 2024-04-04 DIAGNOSIS — E11.9 TYPE 2 DIABETES MELLITUS WITHOUT COMPLICATION, WITHOUT LONG-TERM CURRENT USE OF INSULIN (HCC): ICD-10-CM

## 2024-04-04 DIAGNOSIS — I10 HYPERTENSION, UNSPECIFIED TYPE: ICD-10-CM

## 2024-04-04 LAB — SL AMB POCT HEMOGLOBIN AIC: 7.8 (ref ?–6.5)

## 2024-04-04 PROCEDURE — 99396 PREV VISIT EST AGE 40-64: CPT

## 2024-04-04 PROCEDURE — 83036 HEMOGLOBIN GLYCOSYLATED A1C: CPT

## 2024-04-04 NOTE — ASSESSMENT & PLAN NOTE
RTC in 6 months  Patient made aware to follow up with ophthalmology and dentistry  PSA ordered  GI referral for colonoscopy placed  Repeat A1c, tsh, CMP with renal panel, and lipid panel

## 2024-04-04 NOTE — PROGRESS NOTES
ADULT ANNUAL PHYSICAL  [facility] INTERNAL MEDICINE [place]    NAME: John Uriarte  AGE: 53 y.o. SEX: male  : 1970     DATE: 2024     Assessment and Plan:     Problem List Items Addressed This Visit          Cardiovascular and Mediastinum    Hypertension     Blood Pressure: 120/80   On amlodipine 5 mg qd and lisinopril 20 mg qd  Blood pressure at goal, continue current regimen               Endocrine    Type 2 diabetes mellitus without complication, without long-term current use of insulin ([facility])       Lab Results   Component Value Date    HGBA1C 8.9 (H) 12/15/2023   Prescribed metformin 500 mg bid and glipizide 5 mg daily  Unable to tolerate glipizide due to stomach cramping, no longer taking it  Patient endorses moderate intensity exercise up to 5 times per week, eats a carb limited diet  A1c at this visit 7.8       Plan:  Increase metformin to 1 g bid   Repeat A1c in 6 months, RTC then  Continue with lifestyle modification listed above         Relevant Medications    metFORMIN (GLUCOPHAGE) 1000 MG tablet    Other Relevant Orders    POCT hemoglobin A1c       Other    Encounter for physical examination - Primary     RTC in 6 months  Patient made aware to follow up with ophthalmology and dentistry  PSA ordered  GI referral for colonoscopy placed  Repeat A1c, tsh, CMP with renal panel, and lipid panel         Relevant Orders    PSA Total (Reflex To Free)    Ambulatory Referral to Gastroenterology    Ambulatory Referral to Ophthalmology    Hemoglobin A1C    TSH, 3rd generation with Free T4 reflex    Albumin / creatinine urine ratio    Comprehensive metabolic panel    Lipid Panel with Direct LDL reflex       Immunizations and preventive care screenings were discussed with patient today. Appropriate education was printed on patient's after visit summary.        Counseling:  Alcohol/drug use: discussed moderation in alcohol intake, the recommendations for healthy alcohol  use, and avoidance of illicit drug use.  Dental Health: discussed importance of regular tooth brushing, flossing, and dental visits.  Injury prevention: discussed safety/seat belts, safety helmets, smoke detectors, carbon dioxide detectors, and smoking near bedding or upholstery.  Sexual health: discussed sexually transmitted diseases, partner selection, use of condoms, avoidance of unintended pregnancy, and contraceptive alternatives.  Exercise: the importance of regular exercise/physical activity was discussed. Recommend exercise 3-5 times per week for at least 30 minutes.          No follow-ups on file.     Chief Complaint:     Chief Complaint   Patient presents with    Physical Exam      History of Present Illness:     Adult Annual Physical   Patient here for a comprehensive physical exam. The patient reports no problems.    Diet and Physical Activity  Diet/Nutrition: well balanced diet and consuming 3-5 servings of fruits/vegetables daily.   Exercise: 5-7 times a week on average.      Depression Screening  PHQ-2/9 Depression Screening           General Health  Sleep: sleeps well and gets 7-8 hours of sleep on average.   Hearing: normal - bilateral.  Vision: no vision problems, most recent eye exam >1 year ago, and wears glasses.   Dental: brushes teeth once daily.        Health  Symptoms include: none    Advanced Care Planning  Do you have an advanced directive? no  Do you have a durable medical power of ? yes  ACP document given to patient? No, patient declined     Review of Systems:     Review of Systems   Constitutional:  Negative for chills and fever.   HENT:  Negative for ear pain and sore throat.    Eyes:  Negative for pain and visual disturbance.   Respiratory:  Negative for cough and shortness of breath.    Cardiovascular:  Negative for chest pain and palpitations.   Gastrointestinal:  Negative for abdominal pain and vomiting.   Genitourinary:  Negative for dysuria and hematuria.    Musculoskeletal:  Negative for arthralgias and back pain.   Skin:  Negative for color change and rash.   Neurological:  Negative for seizures and syncope.   All other systems reviewed and are negative.     Past Medical History:     Past Medical History:   Diagnosis Date    Hypertension       Past Surgical History:     Past Surgical History:   Procedure Laterality Date    HAND SURGERY Right 2001    plastic bone      Family History:     No family history on file.   Social History:     Social History     Socioeconomic History    Marital status: Single     Spouse name: None    Number of children: None    Years of education: None    Highest education level: None   Occupational History    None   Tobacco Use    Smoking status: Never     Passive exposure: Never    Smokeless tobacco: Never   Vaping Use    Vaping status: Never Used   Substance and Sexual Activity    Alcohol use: Never    Drug use: Never    Sexual activity: None   Other Topics Concern    None   Social History Narrative    None     Social Determinants of Health     Financial Resource Strain: Not on file   Food Insecurity: Not on file   Transportation Needs: Not on file   Physical Activity: Not on file   Stress: Not on file   Social Connections: Not on file   Intimate Partner Violence: Not on file   Housing Stability: Not on file      Current Medications:     Current Outpatient Medications   Medication Sig Dispense Refill    amLODIPine (NORVASC) 5 mg tablet Take 1 tablet (5 mg total) by mouth daily 30 tablet 2    diclofenac (VOLTAREN) 75 mg EC tablet Take 75 mg by mouth 2 (two) times daily after meals      glipiZIDE (GLUCOTROL) 5 mg tablet Take 1 tablet (5 mg total) by mouth daily with breakfast 30 tablet 2    lisinopril (ZESTRIL) 20 mg tablet Take 1 tablet (20 mg total) by mouth daily 30 tablet 2    metFORMIN (GLUCOPHAGE) 1000 MG tablet Take 1 tablet (1,000 mg total) by mouth 2 (two) times a day with meals 60 tablet 5     No current facility-administered  "medications for this visit.      Allergies:     No Known Allergies   Physical Exam:     /80 (BP Location: Left arm, Patient Position: Sitting, Cuff Size: Large)   Pulse 67   Temp 98.2 °F (36.8 °C) (Tympanic)   Resp 14   Ht 5' 9\" (1.753 m)   Wt 104 kg (230 lb)   SpO2 98%   BMI 33.97 kg/m²     Physical Exam  Vitals and nursing note reviewed.   Constitutional:       General: He is not in acute distress.     Appearance: Normal appearance. He is well-developed.   HENT:      Head: Normocephalic and atraumatic.      Mouth/Throat:      Mouth: Mucous membranes are moist.      Pharynx: Oropharynx is clear.   Eyes:      Extraocular Movements: Extraocular movements intact.      Pupils: Pupils are equal, round, and reactive to light.   Cardiovascular:      Rate and Rhythm: Normal rate and regular rhythm.      Heart sounds: No murmur heard.  Pulmonary:      Effort: Pulmonary effort is normal. No respiratory distress.      Breath sounds: Normal breath sounds.   Abdominal:      Palpations: Abdomen is soft.      Tenderness: There is no abdominal tenderness.   Musculoskeletal:         General: No swelling.      Cervical back: Normal range of motion and neck supple.      Right lower leg: No edema.      Left lower leg: No edema.   Skin:     General: Skin is warm and dry.      Capillary Refill: Capillary refill takes less than 2 seconds.   Neurological:      General: No focal deficit present.      Mental Status: He is alert and oriented to person, place, and time. Mental status is at baseline.   Psychiatric:         Mood and Affect: Mood normal.         Behavior: Behavior normal.          Jose R Painting MD  [facility] INTERNAL MEDICINE [place]    "

## 2024-04-04 NOTE — ASSESSMENT & PLAN NOTE
Lab Results   Component Value Date    HGBA1C 8.9 (H) 12/15/2023   Prescribed metformin 500 mg bid and glipizide 5 mg daily  Unable to tolerate glipizide due to stomach cramping, no longer taking it  Patient endorses moderate intensity exercise up to 5 times per week, eats a carb limited diet  A1c at this visit 7.8       Plan:  Increase metformin to 1 g bid   Repeat A1c in 6 months, RTC then  Continue with lifestyle modification listed above

## 2024-04-04 NOTE — ASSESSMENT & PLAN NOTE
Blood Pressure: 120/80   On amlodipine 5 mg qd and lisinopril 20 mg qd  Blood pressure at goal, continue current regimen

## 2024-04-10 ENCOUNTER — TELEPHONE (OUTPATIENT)
Age: 54
End: 2024-04-10

## 2024-04-10 NOTE — TELEPHONE ENCOUNTER
Patients GI provider:  ENEIDA     Number to return call: [phone]    Reason for call: Pt looking to establish care- no GI complaints/issues     Scheduled procedure/appointment date if applicable: Appt 5/24/24

## 2024-05-28 DIAGNOSIS — E11.9 TYPE 2 DIABETES MELLITUS WITHOUT COMPLICATION, WITHOUT LONG-TERM CURRENT USE OF INSULIN (HCC): ICD-10-CM

## 2024-12-11 ENCOUNTER — OFFICE VISIT (OUTPATIENT)
Dept: INTERNAL MEDICINE CLINIC | Facility: CLINIC | Age: 54
End: 2024-12-11
Payer: COMMERCIAL

## 2024-12-11 VITALS
HEIGHT: 69 IN | BODY MASS INDEX: 31.4 KG/M2 | SYSTOLIC BLOOD PRESSURE: 112 MMHG | DIASTOLIC BLOOD PRESSURE: 80 MMHG | WEIGHT: 212 LBS | OXYGEN SATURATION: 98 % | HEART RATE: 90 BPM | RESPIRATION RATE: 16 BRPM | TEMPERATURE: 97.6 F

## 2024-12-11 DIAGNOSIS — I10 HYPERTENSION, UNSPECIFIED TYPE: ICD-10-CM

## 2024-12-11 DIAGNOSIS — E11.9 TYPE 2 DIABETES MELLITUS WITHOUT COMPLICATION, WITHOUT LONG-TERM CURRENT USE OF INSULIN (HCC): ICD-10-CM

## 2024-12-11 DIAGNOSIS — K31.84 GASTROPARESIS: Primary | ICD-10-CM

## 2024-12-11 PROCEDURE — 99214 OFFICE O/P EST MOD 30 MIN: CPT | Performed by: HOSPITALIST

## 2024-12-11 RX ORDER — ONDANSETRON 4 MG/1
4 TABLET, FILM COATED ORAL EVERY 8 HOURS PRN
Qty: 20 TABLET | Refills: 0 | Status: SHIPPED | OUTPATIENT
Start: 2024-12-11

## 2024-12-11 RX ORDER — GLIPIZIDE 5 MG/1
5 TABLET ORAL
Qty: 30 TABLET | Refills: 2 | Status: SHIPPED | OUTPATIENT
Start: 2024-12-11

## 2024-12-11 RX ORDER — AMLODIPINE BESYLATE 5 MG/1
5 TABLET ORAL DAILY
Qty: 30 TABLET | Refills: 2 | Status: SHIPPED | OUTPATIENT
Start: 2024-12-11

## 2024-12-11 RX ORDER — LISINOPRIL 20 MG/1
20 TABLET ORAL DAILY
Qty: 30 TABLET | Refills: 2 | Status: SHIPPED | OUTPATIENT
Start: 2024-12-11

## 2024-12-11 RX ORDER — METFORMIN HYDROCHLORIDE 500 MG/1
2000 TABLET, EXTENDED RELEASE ORAL
Qty: 120 TABLET | Refills: 0 | Status: SHIPPED | OUTPATIENT
Start: 2024-12-11

## 2024-12-11 NOTE — PROGRESS NOTES
INTERNAL MEDICINE FOLLOW-UP OFFICE VISIT  [facility] - [facility] INTERNAL MEDICINE [place]    NAME: John Uriarte  AGE: 54 y.o. SEX: male  : 1970     DATE: 2024     Assessment and Plan:     1. Type 2 diabetes mellitus without complication, without long-term current use of insulin (HCC)  Patient was started on metformin 1000 mg twice a day which she is not taking regularly secondary to his stomach upset with the metformin immediate in preparation.  Will change to metformin XR 1 g twice a day in an effort to reduce the stomach irritation.  Just after meals in addition to restarting the glipizide.  Patient will come back next week for a reevaluation to see how his blood sugars have been doing after taking his medication regularly for a week.  States that he was tried on Januvia earlier however co-pay was too high.  If not controlled may need to begin insulin however patient not willing currently given the nature of his job.  No evidence of peripheral neuropathy currently  - glipiZIDE (GLUCOTROL) 5 mg tablet; Take 1 tablet (5 mg total) by mouth daily with breakfast  Dispense: 30 tablet; Refill: 2  - metFORMIN (GLUCOPHAGE-XR) 500 mg 24 hr tablet; Take 4 tablets (2,000 mg total) by mouth daily with breakfast  Dispense: 120 tablet; Refill: 0    - ondansetron (ZOFRAN) 4 mg tablet; Take 1 tablet (4 mg total) by mouth every 8 (eight) hours as needed for nausea or vomiting  Dispense: 20 tablet; Refill: 0    3. Hypertension, unspecified type  Blood pressure well-controlled.  Continue amlodipine and lisinopril.  - amLODIPine (NORVASC) 5 mg tablet; Take 1 tablet (5 mg total) by mouth daily  Dispense: 30 tablet; Refill: 2  - lisinopril (ZESTRIL) 20 mg tablet; Take 1 tablet (20 mg total) by mouth daily  Dispense: 30 tablet; Refill: 2    Patient to come back next week for assessment of his blood sugar status after taking his medications regularly.  May need endocrine eval if still uncontrolled.        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  He was  seeing in April of this year with a glycosylated hemoglobin of 7.9 and was on metformin 1000 mg twice a day with glipizide 5 mg daily.  Patient however states that he never received his glipizide and has been taking his metformin irregularly secondary to stomach upset with this medication.  Although he tries to take it once a day.  Blood sugars today are in the 400+ range.  Willing to try the XR of the ER preparation to decrease stomach upset.   not willing to be on insulin given the nature of his job.  No evidence of any peripheral neuropathy.  Urine proteins have been ordered but pending.    The following portions of the patient's history were reviewed and updated as appropriate: allergies, current medications, past family history, past medical history, past social history, past surgical history and problem list.     Review of Systems:     Review of Systems     Problem List:     Patient Active Problem List   Diagnosis   • Type 2 diabetes mellitus without complication, without long-term current use of insulin (HCC)   • Hypertension   • Encounter for physical examination        Objective:     /80 (BP Location: Left arm, Patient Position: Sitting, Cuff Size: Large)   Pulse 90   Temp 97.6 °F (36.4 °C) (Tympanic)   Resp 16   Ht 5' 9\" (1.753 m)   Wt 96.2 kg (212 lb)   SpO2 98%   BMI 31.31 kg/m²     Physical Exam  Cardiovascular:      Pulses: no weak pulses.           Dorsalis pedis pulses are 2+ on the right side and 2+ on the left side.        Posterior tibial pulses are 2+ on the right side and 2+ on the left side.   Feet: "      Right foot:      Skin integrity: Callus present. No ulcer, skin breakdown, erythema, warmth or dry skin.      Left foot:      Skin integrity: Callus present. No ulcer, skin breakdown, erythema, warmth or dry skin.       Diabetic Foot Exam    Patient's shoes and socks removed.    Right Foot/Ankle   Right Foot Inspection  Skin Exam: skin normal, skin intact, callus and callus. No dry skin, no warmth, no erythema, no maceration, no abnormal color, no pre-ulcer and no ulcer.     Toe Exam: ROM and strength within normal limits.     Sensory   Vibration: intact  Proprioception: intact  Monofilament testing: intact    Vascular  Capillary refills: < 3 seconds  The right DP pulse is 2+. The right PT pulse is 2+.     Left Foot/Ankle  Left Foot Inspection  Skin Exam: skin normal, skin intact and callus. No dry skin, no warmth, no erythema, no maceration, normal color, no pre-ulcer and no ulcer.     Toe Exam: ROM and strength within normal limits.     Sensory   Vibration: intact  Proprioception: intact  Monofilament testing: intact    Vascular  Capillary refills: < 3 seconds  The left DP pulse is 2+. The left PT pulse is 2+.     Assign Risk Category  No deformity present  No loss of protective sensation  No weak pulses  Risk: 0    General Appearance:    Alert, cooperative, no distress   Head:    Normocephalic, without obvious abnormality, atraumatic   Eyes:    PERRL, conjunctiva/corneas clear, EOM's intact, fundi     benign, both eyes        Neck:   Supple, no adenopathy, no JVD   Back:     Symmetric, no curvature, ROM normal, no CVA tenderness   Lungs:     Clear to auscultation bilaterally, no wheezing or rhonchi   Heart:    Regular rate and rhythm, S1 and S2 normal, no murmur, rub   or gallop   Abdomen:     Soft, non-tender, bowel sounds active    Extremities:   Extremities normal, atraumatic, no cyanosis or edema   Psych:   Normal Affect   Neurologic:   CNII-XII intact. Normal strength, sensation and reflexes        Throughout     Pertinent Laboratory/Diagnostic Studies:    Laboratory Results: Labs have been ordered-not done as yet    Ambulatory referral to ophthalmology    Kerri Delacruz MD  [facility] INTERNAL MEDICINE [place]

## 2024-12-19 ENCOUNTER — OFFICE VISIT (OUTPATIENT)
Dept: INTERNAL MEDICINE CLINIC | Facility: CLINIC | Age: 54
End: 2024-12-19
Payer: COMMERCIAL

## 2024-12-19 ENCOUNTER — TELEPHONE (OUTPATIENT)
Age: 54
End: 2024-12-19

## 2024-12-19 VITALS
WEIGHT: 218 LBS | HEIGHT: 69 IN | RESPIRATION RATE: 16 BRPM | TEMPERATURE: 97.6 F | HEART RATE: 71 BPM | BODY MASS INDEX: 32.29 KG/M2 | OXYGEN SATURATION: 99 % | DIASTOLIC BLOOD PRESSURE: 70 MMHG | SYSTOLIC BLOOD PRESSURE: 122 MMHG

## 2024-12-19 DIAGNOSIS — E11.9 TYPE 2 DIABETES MELLITUS WITHOUT COMPLICATION, WITHOUT LONG-TERM CURRENT USE OF INSULIN (HCC): Primary | ICD-10-CM

## 2024-12-19 DIAGNOSIS — Z12.11 COLON CANCER SCREENING: ICD-10-CM

## 2024-12-19 LAB — SL AMB POCT HEMOGLOBIN AIC: 12.9 (ref ?–6.5)

## 2024-12-19 PROCEDURE — 99213 OFFICE O/P EST LOW 20 MIN: CPT | Performed by: INTERNAL MEDICINE

## 2024-12-19 PROCEDURE — 83036 HEMOGLOBIN GLYCOSYLATED A1C: CPT | Performed by: INTERNAL MEDICINE

## 2024-12-19 RX ORDER — ATORVASTATIN CALCIUM 20 MG/1
20 TABLET, FILM COATED ORAL DAILY
Qty: 90 TABLET | Refills: 3 | Status: SHIPPED | OUTPATIENT
Start: 2024-12-19

## 2024-12-19 RX ORDER — LANCETS 33 GAUGE
EACH MISCELLANEOUS
Qty: 300 EACH | Refills: 3 | Status: SHIPPED | OUTPATIENT
Start: 2024-12-19

## 2024-12-19 RX ORDER — BLOOD SUGAR DIAGNOSTIC
STRIP MISCELLANEOUS
Qty: 300 EACH | Refills: 3 | Status: SHIPPED | OUTPATIENT
Start: 2024-12-19

## 2024-12-19 RX ORDER — BLOOD-GLUCOSE METER
KIT MISCELLANEOUS
Qty: 1 KIT | Refills: 0 | Status: SHIPPED | OUTPATIENT
Start: 2024-12-19

## 2024-12-19 NOTE — LETTER
December 20, 2024     Patient: John Uriarte  YOB: 1970  Date of Visit: 12/19/2024      To Whom it May Concern:    John Uriarte is under my professional care. John was seen in my office on 12/19/2024. Please excuse patient from work until 12/26/24 when he is going to be re-evaluated to return to work.    If you have any questions or concerns, please don't hesitate to call.         Sincerely,          Tanner Swenson, DO        CC: No Recipients

## 2024-12-19 NOTE — TELEPHONE ENCOUNTER
PA for Blood Glucose Monitoring Suppl (OneTouch Verio Reflect) w/Device KIT SUBMITTED to [facility]     via    []CMM-KEY:   [x]Surescripts-Case ID #: Not provided  []Availity-Auth ID #   []Faxed to plan   []Other website   []Phone call Case ID #     [x]PA sent as URGENT    All office notes, labs and other pertaining documents and studies sent. Clinical questions answered. Awaiting determination from insurance company.     Turnaround time for your insurance to make a decision on your Prior Authorization can take 7-21 business days.

## 2024-12-19 NOTE — PROGRESS NOTES
Name: John Uriarte      : 1970      MRN: [id number]  Encounter Provider: Tanner Swenson DO  Encounter Date: 2024   Encounter department: [facility] INTERNAL MEDICINE [place]  :  Assessment & Plan  Type 2 diabetes mellitus without complication, without long-term current use of insulin ([facility])    Lab Results   Component Value Date    HGBA1C 7.8 (A) 2024      - A1c in office today: 12.9%  - blood sugars at home were not being checked     - Stressed that he needs to check blood sugar at home in the morning, before lunch, and before dinner prior to getting his paperwork filled out for his job. Asked him to bring in another form.      - glucometer sent in, advised him to talk to pharmacist for cheaper options if he has to, given a chart to document blood sugars  - Stressed the importance of getting lab work done to evaluate overall health with uncontrolled diabetes, including kidney function and cholesterol  - Discussed that he should be on at least a moderate intensity statin with his diabetes. Patient is agreeable to start, will start atorvastatin 20mg until lipid panel is updated, may need to increase to high intensity  -We also discussed potential ramifications of having uncontrolled diabetes such as worsening vision, heart disease, neuropathy, kidney disease, heart attack or stroke  -Discussed possible endocrinology referral, he would like to wait for now    Orders:    atorvastatin (LIPITOR) 20 mg tablet; Take 1 tablet (20 mg total) by mouth daily    Blood Glucose Monitoring Suppl (OneTouch Verio Reflect) w/Device KIT; Check blood sugars three times daily. Please substitute with appropriate alternative as covered by patient's insurance. Dx: E11.65    glucose blood (OneTouch Verio) test strip; Check blood sugars three times daily. Please substitute with appropriate alternative as covered by patient's insurance. Dx: E11.65    OneTouch Delica Lancets 33G MISC; Check blood sugars three  "times daily. Please substitute with appropriate alternative as covered by patient's insurance. Dx: E11.65    Colon cancer screening  -Discussed options including colonoscopy, Cologuard, fit test.  Patient would like to proceed with Cologuard  Orders:    Cologuard           History of Present Illness     John Uriarte presents today for follow-up of his blood sugars.  He says he is feeling better, no more nausea, polydipsia, polyuria.  He has not been checking his sugars at home.  He has been taking metformin and glipizide as prescribed.  Has not been having any side effects of the metformin like he was previously.      Review of Systems   All other systems reviewed and are negative.      Objective   /70 (BP Location: Left arm, Patient Position: Sitting, Cuff Size: Large)   Pulse 71   Temp 97.6 °F (36.4 °C) (Tympanic)   Resp 16   Ht 5' 9\" (1.753 m)   Wt 98.9 kg (218 lb)   SpO2 99%   BMI 32.19 kg/m²      Physical Exam  Constitutional:       General: He is not in acute distress.     Appearance: He is obese.   HENT:      Mouth/Throat:      Mouth: Mucous membranes are moist.      Pharynx: Oropharynx is clear.   Cardiovascular:      Rate and Rhythm: Normal rate and regular rhythm.      Heart sounds: No murmur heard.  Pulmonary:      Effort: Pulmonary effort is normal. No respiratory distress.      Breath sounds: Normal breath sounds. No wheezing or rales.   Skin:     General: Skin is warm and dry.   Neurological:      Mental Status: He is alert and oriented to person, place, and time. Mental status is at baseline.         "

## 2024-12-19 NOTE — ASSESSMENT & PLAN NOTE
Lab Results   Component Value Date    HGBA1C 7.8 (A) 04/04/2024      - A1c in office today: 12.9%  - blood sugars at home were not being checked     - Stressed that he needs to check blood sugar at home in the morning, before lunch, and before dinner prior to getting his paperwork filled out for his job. Asked him to bring in another form.      - glucometer sent in, advised him to talk to pharmacist for cheaper options if he has to, given a chart to document blood sugars  - Stressed the importance of getting lab work done to evaluate overall health with uncontrolled diabetes, including kidney function and cholesterol  - Discussed that he should be on at least a moderate intensity statin with his diabetes. Patient is agreeable to start, will start atorvastatin 20mg until lipid panel is updated, may need to increase to high intensity  -We also discussed potential ramifications of having uncontrolled diabetes such as worsening vision, heart disease, neuropathy, kidney disease, heart attack or stroke  -Discussed possible endocrinology referral, he would like to wait for now    Orders:    atorvastatin (LIPITOR) 20 mg tablet; Take 1 tablet (20 mg total) by mouth daily    Blood Glucose Monitoring Suppl (OneTouch Verio Reflect) w/Device KIT; Check blood sugars three times daily. Please substitute with appropriate alternative as covered by patient's insurance. Dx: E11.65    glucose blood (OneTouch Verio) test strip; Check blood sugars three times daily. Please substitute with appropriate alternative as covered by patient's insurance. Dx: E11.65    OneTouch Delica Lancets 33G MISC; Check blood sugars three times daily. Please substitute with appropriate alternative as covered by patient's insurance. Dx: E11.65     VICTORINO ROMERO; First Name: Marianela GA 24.6 weeks;     Age: 28  d;   PMA: 28.6  BW:  789    MRN: [id number]    COURSE: presumed 24 week,  affected by placental abruption, RDS, IDM suspected, maternal hypothyroidism,  PDA , bilat Gr II bleed    s/p respiratory failure, metabolic acidosis, presumed sepsis, hyperbilirubinemia,  INTERVAL EVENTS: Desaturations    Weight (g): 1090 + 60          Intake (ml/kg/day): 147  Urine output (ml/kg/hr or frequency): x 8        Stools (frequency): x 8   Other: incubator -     Growth:    HC (cm): 23 - 4%ile Length (cm):  35 - 30%ile [place] weight %  38%ile ; ADWG (g/day)  18  *******************************************************  Respiratory: RDS, pulmonary insufficiency of prematurity. s/p surfactant. s/p SIMV (extubated ).   Currently on bCPAP 6 FiO2 0.21.  Caffeine (10 mg/kg/d) for apnea of prematurity (-). Continuous cardiorespiratory monitoring for risk of apnea of prematurity and associated bradycardia.    CV: Hemodynamically stable. Murmur appreciated on exam. ECHO : large PDA, s/p IV Ibuprofen (-). Second course  - . Follow up echo .  Repeat echo  : Large PDA with continuous L>R shunt.  - Large PDA with continuous L>R shunt.  FEN: Feeding FEHM/HMF 24 kcal 20 ml q3H over 90 minutes (155/124) + MCT. s/p TPN.  On FeSO4.  ACCESS: s/p UV (),  s/p UAC (-). D/C PICC .    Heme: S/P hyperbilirubinemia due to prematurity and ecchymosis, s/p phototherapy (). Anemia (Hct on  was 32); s/p PRBCs (). Hct has been stable since  HUS. Continue to monitor for anemia and thrombocytopenia.   ID: Presumed sepsis; s/p amp/gent (-).  BCx ([facility] at [facility]) negative. Monitor for signs of sepsis.    Neuro: HUS at 1 week (): bilateral Grade II IVH. Repeat  b/l IVH with increased dilation of the lateral ventricles, ??? intraparenchymal  small bleed  Repeat : unchanged. Follow up 1 month, and term-equivalent. NDE PTD.   ENDO: Maternal hypothyroidism. TFT  - WNL. Follow with endocrinology.   Ophtho: At risk for ROP due to birth weight <1500g and/or GA < 31wk. For ROP screening at 4 weeks of age/31 weeks PMA.   Skin: Triad to perineum.   Thermal: Immature thermoregulation requiring heated incubator to prevent hypothermia.   Other:  Breech - hip US at 44-46 weeks PMA.   Social:  UTox: negative. Detailed update for mother daily - last on  (RK) Discussed possible need for TCPC.  PLAN: Continue bCPAP 6 cm. Re-evaluate PDA week of  - may need TCPC. Advance feeds gradually as tolerated. Continue MCT oil.   Labs/Imaging/Studies:  - Hct, retic, nutrition    This patient requires ICU care including continuous monitoring and frequent vital sign assessment due to significant risk of cardiorespiratory compromise or decompensation outside of the NICU.

## 2024-12-23 NOTE — TELEPHONE ENCOUNTER
PA for Blood Glucose Monitoring Suppl (OneTouch Verio Reflect) w/Device KIT DENIED    Reason:(Screenshot if applicable)        Message sent to office clinical pool Yes    Denial letter scanned into Media Yes    Appeal started No (Provider will need to decide if appeal is warranted and send clinical documentation to Prior Authorization Team for initiation.)    **Please follow up with your patient regarding denial and next steps**

## 2024-12-24 ENCOUNTER — TELEPHONE (OUTPATIENT)
Dept: INTERNAL MEDICINE CLINIC | Facility: CLINIC | Age: 54
End: 2024-12-24

## 2024-12-24 DIAGNOSIS — E11.9 TYPE 2 DIABETES MELLITUS WITHOUT COMPLICATION, WITHOUT LONG-TERM CURRENT USE OF INSULIN (HCC): Primary | ICD-10-CM

## 2024-12-24 NOTE — TELEPHONE ENCOUNTER
Looks like his insurance does not cover any glucose monitoring, would recommend to get over the counter or try a secondary insurance

## 2024-12-24 NOTE — TELEPHONE ENCOUNTER
Patient didn't get him monitor, when I looked the one touch verio was denied. Anything else we can give him?

## 2024-12-26 ENCOUNTER — TELEPHONE (OUTPATIENT)
Age: 54
End: 2024-12-26

## 2024-12-26 ENCOUNTER — OFFICE VISIT (OUTPATIENT)
Dept: INTERNAL MEDICINE CLINIC | Facility: CLINIC | Age: 54
End: 2024-12-26
Payer: COMMERCIAL

## 2024-12-26 VITALS
SYSTOLIC BLOOD PRESSURE: 110 MMHG | HEART RATE: 99 BPM | TEMPERATURE: 98 F | DIASTOLIC BLOOD PRESSURE: 70 MMHG | RESPIRATION RATE: 14 BRPM | HEIGHT: 69 IN | WEIGHT: 214 LBS | OXYGEN SATURATION: 99 % | BODY MASS INDEX: 31.7 KG/M2

## 2024-12-26 DIAGNOSIS — E11.9 TYPE 2 DIABETES MELLITUS WITHOUT COMPLICATION, WITHOUT LONG-TERM CURRENT USE OF INSULIN (HCC): Primary | ICD-10-CM

## 2024-12-26 DIAGNOSIS — I10 HYPERTENSION, UNSPECIFIED TYPE: ICD-10-CM

## 2024-12-26 PROCEDURE — 99214 OFFICE O/P EST MOD 30 MIN: CPT | Performed by: HOSPITALIST

## 2024-12-26 NOTE — TELEPHONE ENCOUNTER
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     She had mentioned they received the fax, but are missing page 1, and were hoping to have that page faxed over to them.     Please advise out to her at [phone]

## 2024-12-26 NOTE — PROGRESS NOTES
INTERNAL MEDICINE FOLLOW-UP OFFICE VISIT  [facility] - [facility] INTERNAL MEDICINE [place]    NAME: John Uriarte  AGE: 54 y.o. SEX: male  : 1970     DATE: 2024     Assessment and Plan:     1. Type 2 diabetes mellitus without complication, without long-term current use of insulin (HCC) (Primary)  Diabetes mellitus type 2 with prior uncontrolled levels due to not being on any medications.  Ever since he has been started on metformin ER 1 g twice a day and glipizide 5 mg daily blood sugars have trended down and is between 150 and 173.  Today blood sugar is 173 tested in the office.  Patient had a bowl of oatmeal's 2 hours prior to coming to the office.  No other complications.  Will repeat a glycosylated hemoglobin in 3 months time given that the last one was done recently prior to him being on any medications.   Have asked him to keep blood sugar logs at home and call us if blood sugars trend persistently above 200.  No evidence of neuropathy, Nephropathy or retinopathy  2. Hypertension, unspecified type  Blood sugar stable.  Continue lisinopril     fitness for driving [facility] [employer] signed and given to the patient.     No follow-ups on file.     Chief Complaint:     Chief Complaint   Patient presents with   • Diabetes        History of Present Illness:   Chapito Lopez presents for follow-up of his blood sugars.  During his last visit on 2024 his blood sugars were elevated with a glycosylated hemoglobin of 12.9.  howeverHe was not taking any medications since the short acting metformin preparation prescribed was causing stomach upset .  He was then changed to an extended release metformin preparation 1 g twice a day and glipizide 5 mg daily which he has been compliant with.  His blood sugars have been trending down and is between 150 and 173.  Random blood sugar in the office today was 173 he had a bowl of oatmeal this morning.  Repeat glycosylated hemoglobin is indicated  "only  in 3 months.  Currently patient has no other symptoms and is eager to get back to work.  I have signed his form from [facility] for fitness for driving.      The following portions of the patient's history were reviewed and updated as appropriate: allergies, current medications, past family history, past medical history, past social history, past surgical history and problem list.     Review of Systems:     Review of SystemsAll other review of symptoms negative unless specified otherwise     Problem List:     Patient Active Problem List   Diagnosis   • Type 2 diabetes mellitus without complication, without long-term current use of insulin (HCC)   • Hypertension   • Encounter for physical examination        Objective:     /70 (BP Location: Left arm, Patient Position: Sitting, Cuff Size: Large)   Pulse 99   Temp 98 °F (36.7 °C) (Tympanic)   Resp 14   Ht 5' 9\" (1.753 m)   Wt 97.1 kg (214 lb)   SpO2 99%   BMI 31.60 kg/m²     Physical Exam  General Appearance:    Alert, cooperative, no distress   Head:    Normocephalic, without obvious abnormality, atraumatic   Eyes:    PERRL, conjunctiva/corneas clear, EOM's intact, fundi     benign, both eyes        Neck:   Supple, no adenopathy, no JVD   Back:     Symmetric, no curvature, ROM normal, no CVA tenderness   Lungs:     Clear to auscultation bilaterally, no wheezing or rhonchi   Heart:    Regular rate and rhythm, S1 and S2 normal, no murmur, rub   or gallop   Abdomen:     Soft, non-tender, bowel sounds active    Extremities:   Extremities normal, atraumatic, no cyanosis or edema   Psych:   Normal Affect   Neurologic:   CNII-XII intact. Normal strength, sensation and reflexes       Throughout       Pertinent Laboratory/Diagnostic Studies:    Laboratory Results: I have personally reviewed the pertinent laboratory results/reports         Kerri Delacruz MD  [facility] INTERNAL MEDICINE [place]      "

## 2025-01-02 ENCOUNTER — OFFICE VISIT (OUTPATIENT)
Dept: INTERNAL MEDICINE CLINIC | Facility: CLINIC | Age: 55
End: 2025-01-02
Payer: COMMERCIAL

## 2025-01-02 VITALS
SYSTOLIC BLOOD PRESSURE: 120 MMHG | WEIGHT: 222 LBS | DIASTOLIC BLOOD PRESSURE: 80 MMHG | OXYGEN SATURATION: 98 % | HEIGHT: 69 IN | TEMPERATURE: 97.8 F | BODY MASS INDEX: 32.88 KG/M2 | HEART RATE: 66 BPM | RESPIRATION RATE: 16 BRPM

## 2025-01-02 DIAGNOSIS — I10 HYPERTENSION, UNSPECIFIED TYPE: ICD-10-CM

## 2025-01-02 DIAGNOSIS — E11.9 TYPE 2 DIABETES MELLITUS WITHOUT COMPLICATION, WITHOUT LONG-TERM CURRENT USE OF INSULIN (HCC): ICD-10-CM

## 2025-01-02 DIAGNOSIS — E11.9 TYPE 2 DIABETES MELLITUS WITHOUT COMPLICATION, WITHOUT LONG-TERM CURRENT USE OF INSULIN (HCC): Primary | ICD-10-CM

## 2025-01-02 LAB — SL AMB POCT HEMOGLOBIN AIC: 11 (ref ?–6.5)

## 2025-01-02 PROCEDURE — 99213 OFFICE O/P EST LOW 20 MIN: CPT | Performed by: STUDENT IN AN ORGANIZED HEALTH CARE EDUCATION/TRAINING PROGRAM

## 2025-01-02 PROCEDURE — 83036 HEMOGLOBIN GLYCOSYLATED A1C: CPT | Performed by: STUDENT IN AN ORGANIZED HEALTH CARE EDUCATION/TRAINING PROGRAM

## 2025-01-02 RX ORDER — BLOOD SUGAR DIAGNOSTIC
STRIP MISCELLANEOUS
Qty: 100 EACH | Refills: 3 | Status: SHIPPED | OUTPATIENT
Start: 2025-01-02

## 2025-01-02 RX ORDER — BLOOD-GLUCOSE METER
KIT MISCELLANEOUS
Qty: 1 KIT | Refills: 0 | Status: SHIPPED | OUTPATIENT
Start: 2025-01-02 | End: 2025-01-03

## 2025-01-02 RX ORDER — LANCETS 33 GAUGE
EACH MISCELLANEOUS
Qty: 100 EACH | Refills: 3 | Status: SHIPPED | OUTPATIENT
Start: 2025-01-02

## 2025-01-02 NOTE — ASSESSMENT & PLAN NOTE
Lab Results   Component Value Date    HGBA1C 12.9 (A) 12/19/2024     Diagnosed in 2023, no family history  No diabetic education, GF taking care of diet, reportedly she got education for her son previously.  Pt reports that he is exercising every day.  Pt states that he is not able to check blood sugar at home as his machine wasn't approved by insurance.  Current regimen: glipizide and metformin 500x4 ER mg daily  No official ophthalmology eval on the chart, pt encouraged to see ophthalmology annualy  No podiatry evaluation. diabetic foot exam was done on 12/14 - intact   Blood work wasn't done, pt encouraged to proceed with previously ordered blood work.  Today: A1C POC 11.0  Pt was educated on hypoglycemic episodes symptoms and management if any  Plan:  Glucometer order placed  Pt encouraged to see endocrinologist  Pt encouraged to proceed with the blood work      Orders:    Blood Glucose Monitoring Suppl (OneTouch Verio Reflect) w/Device KIT; Check blood sugars once daily. Please substitute with appropriate alternative as covered by patient's insurance. Dx: E11.65    glucose blood (OneTouch Verio) test strip; Check blood sugars once daily. Please substitute with appropriate alternative as covered by patient's insurance. Dx: E11.65    OneTouch Delica Lancets 33G MISC; Check blood sugars once daily. Please substitute with appropriate alternative as covered by patient's insurance. Dx: E11.65    POCT hemoglobin A1c    Ambulatory Referral to Endocrinology; Future

## 2025-01-02 NOTE — PROGRESS NOTES
Name: John Uriarte      : 1970      MRN: [id number]  Encounter Provider: Leanna Pizarro MD  Encounter Date: 2025   Encounter department: [facility] INTERNAL MEDICINE [place]  :  Assessment & Plan  Type 2 diabetes mellitus without complication, without long-term current use of insulin (HCC)    Lab Results   Component Value Date    HGBA1C 12.9 (A) 2024     Diagnosed in , no family history  No diabetic education, GF taking care of diet, reportedly she got education for her son previously.  Pt reports that he is exercising every day.  Pt states that he is not able to check blood sugar at home as his machine wasn't approved by insurance.  Current regimen: glipizide and metformin 500x4 ER mg daily  No official ophthalmology eval on the chart, pt encouraged to see ophthalmology annualy  No podiatry evaluation. diabetic foot exam was done on  - intact   Blood work wasn't done, pt encouraged to proceed with previously ordered blood work.  Today: A1C POC 11.0  Pt was educated on hypoglycemic episodes symptoms and management if any  Plan:  Glucometer order placed  Pt encouraged to see endocrinologist  Pt encouraged to proceed with the blood work      Orders:    Blood Glucose Monitoring Suppl (OneTouch Verio Reflect) w/Device KIT; Check blood sugars once daily. Please substitute with appropriate alternative as covered by patient's insurance. Dx: E11.65    glucose blood (OneTouch Verio) test strip; Check blood sugars once daily. Please substitute with appropriate alternative as covered by patient's insurance. Dx: E11.65    OneTouch Delica Lancets 33G MISC; Check blood sugars once daily. Please substitute with appropriate alternative as covered by patient's insurance. Dx: E11.65    POCT hemoglobin A1c    Ambulatory Referral to Endocrinology; Future    Hypertension, unspecified type  Well controlled  C/w amlodipine and lisinopril              History of Present Illness     A 55 yo m with  PMH of HTN and T2DM who presents to follow up on his blood sugar control. A1C 12.9 on 12/29. Recently regimen was adjusted to metformin extended release 2 g D  (4 pills daily) and glipizide 5 mg daily.    Last visit is on 12/26/24.    Today pt is feeling well and denies any active complaints. Pt states that new medications has no side effects including upset stomach. Pt is concerned that he is unable to go back to his work as their requirements is to have Hgb A1c in a range from 9-10. Pt denies hypoglycemic episodes. Pt is not checking his blood sugar as his glucometer wasn't approved by insurance.                    Review of Systems   Constitutional:  Negative for chills and fever.   HENT:  Negative for ear pain and sore throat.    Eyes:  Negative for pain and visual disturbance.   Respiratory:  Negative for cough and shortness of breath.    Cardiovascular:  Negative for chest pain and palpitations.   Gastrointestinal:  Negative for abdominal pain and vomiting.   Genitourinary:  Negative for dysuria and hematuria.   Musculoskeletal:  Negative for arthralgias and back pain.   Skin:  Negative for color change and rash.   Neurological:  Negative for seizures and syncope.   All other systems reviewed and are negative.      Objective   There were no vitals taken for this visit.     Physical Exam  Vitals and nursing note reviewed.   Constitutional:       General: He is not in acute distress.     Appearance: He is well-developed.   HENT:      Head: Normocephalic and atraumatic.   Eyes:      Conjunctiva/sclera: Conjunctivae normal.   Cardiovascular:      Rate and Rhythm: Normal rate and regular rhythm.      Heart sounds: No murmur heard.  Pulmonary:      Effort: Pulmonary effort is normal. No respiratory distress.      Breath sounds: Normal breath sounds.   Abdominal:      Palpations: Abdomen is soft.      Tenderness: There is no abdominal tenderness.   Musculoskeletal:         General: No swelling.      Cervical back:  Neck supple.   Skin:     General: Skin is warm and dry.      Capillary Refill: Capillary refill takes less than 2 seconds.   Neurological:      Mental Status: He is alert.   Psychiatric:         Mood and Affect: Mood normal.     Nutrition Assessment and Intervention:     Reviewed food recall journal      Physical Activity Assessment and Intervention:    Activity journal reviewed      Emotional and Mental Well-being, Sleep, Connectedness Assessment and Intervention:    Sleep/stress assessment performed      Tobacco and Toxic Substance Assessment and Intervention:     Tobacco use screening performed      Therapeutic Lifestyle Change Visit:     One-on-one comprehensive counseling, coaching, and health behavior change visit completed       Administrative Statements   I have spent a total time of 35 minutes in caring for this patient on the day of the visit/encounter including Diagnostic results, Prognosis, Risks and benefits of tx options, Instructions for management, Patient and family education, Importance of tx compliance, Risk factor reductions, Impressions, Counseling / Coordination of care, Documenting in the medical record, Reviewing / ordering tests, medicine, procedures  , Obtaining or reviewing history  , and Communicating with other healthcare professionals .

## 2025-01-03 ENCOUNTER — CONSULT (OUTPATIENT)
Dept: INTERNAL MEDICINE CLINIC | Facility: CLINIC | Age: 55
End: 2025-01-03
Payer: COMMERCIAL

## 2025-01-03 VITALS
DIASTOLIC BLOOD PRESSURE: 110 MMHG | OXYGEN SATURATION: 97 % | TEMPERATURE: 97 F | WEIGHT: 225.4 LBS | SYSTOLIC BLOOD PRESSURE: 150 MMHG | BODY MASS INDEX: 31.56 KG/M2 | HEART RATE: 84 BPM | HEIGHT: 71 IN

## 2025-01-03 DIAGNOSIS — I10 HYPERTENSION, UNSPECIFIED TYPE: ICD-10-CM

## 2025-01-03 DIAGNOSIS — E11.9 TYPE 2 DIABETES MELLITUS WITHOUT COMPLICATION, WITHOUT LONG-TERM CURRENT USE OF INSULIN (HCC): Primary | ICD-10-CM

## 2025-01-03 PROCEDURE — 99244 OFF/OP CNSLTJ NEW/EST MOD 40: CPT | Performed by: INTERNAL MEDICINE

## 2025-01-03 RX ORDER — AMLODIPINE BESYLATE 5 MG/1
5 TABLET ORAL DAILY
Qty: 90 TABLET | Refills: 1 | Status: SHIPPED | OUTPATIENT
Start: 2025-01-03

## 2025-01-03 RX ORDER — GLIPIZIDE 5 MG/1
5 TABLET ORAL
Qty: 90 TABLET | Refills: 1 | Status: SHIPPED | OUTPATIENT
Start: 2025-01-03

## 2025-01-03 RX ORDER — METFORMIN HYDROCHLORIDE 500 MG/1
TABLET, EXTENDED RELEASE ORAL
Qty: 360 TABLET | Refills: 1 | Status: SHIPPED | OUTPATIENT
Start: 2025-01-03

## 2025-01-03 RX ORDER — BLOOD-GLUCOSE METER
EACH MISCELLANEOUS
Qty: 1 EACH | Refills: 1 | Status: SHIPPED | OUTPATIENT
Start: 2025-01-03

## 2025-01-03 NOTE — PATIENT INSTRUCTIONS
Dear John Uriarte,     Notable Medication Adjustments -   Please continue glipizide 5 mg daily and metformin  mg.   Testing Required  -   Repeat A1c in March.  Complete other pending lab work.  Other Instructions -   Please take all your medications regularly as directed.  Obtain OneTouch Verio Glucose reader.  Check your blood sugars at home.  See if your insurance covers continuous glucose monitors (Called [employer] and [employer]).  Get retina exam for your eyes from eye doctor.   Return in 6-8 weeks.      Sincerely,   Hermelindo Carson MD

## 2025-01-03 NOTE — PROGRESS NOTES
Name: John Uriarte      : 1970      MRN: [id number]  Encounter Provider: Skylar Mejia Endo Resident  Encounter Date: 1/3/2025   Encounter department: [facility] MEDICINE [place] [facility]  :  Assessment & Plan  Type 2 diabetes mellitus without complication, without long-term current use of insulin ([facility])    Lab Results   Component Value Date    HGBA1C 11.0 (A) 2025     Current medical management includes metformin  mg daily and glipizide 5 mg daily with breakfast.    Initially patient was started on metformin immediate release and did not tolerated due to GI side effects.    He also had been regularly exercising but due to his work however constrictions has been exercising less than usual.    He was recently seen in our clinic and was started on extended release metformin 500 mg daily and/5 mg daily and is tolerating these medications well.    A1c was repeated after 2 weeks which showed some reduction to 11 however this lab value is clinically nonsignificant given the A1c needs to be checked every 3 months.    He continues to report being unable to return to work given elevation of his A1c.    We talked about glucose monitor to get additional data points which his work clinic and may accept.    We also discussed pursuing diabetes education and continuous glucose monitors.  Given the A1c of 11 and recent medication changes, we recommend rechecking around March.  Also discussed need for eye exam and frequent monitoring of foot wounds.    Orders:    Blood Glucose Monitoring Suppl (OneTouch Verio Flex System) TIRSHA; Use to test twice daily.    Ambulatory Referral to Diabetic Education; Future    Hypertension, unspecified type  Blood pressures at home have been  well-controlled  His blood pressure was elevated today in the office however he has not taken his medication yet.  Contributing factors include lifestyle modification with diet and exercise.  Current medical regimen includes  "amlodipine 5 mg daily and lisinopril 20 mg daily  We recommend continuing current medical regimen  Would advise that  continue to monitor BP at home.           History of Present Illness   HPI  John Uriarte is a 54 y.o. male who presents for evaluation of type 2 diabetes.  His A1c's have been uptrending despite dietary modification and exercise.  He was off of his metformin due to GI side effects.  During recent office visit we initiated metformin  mg and glipizide 5 mg daily.  He seems to be tolerating his medications well. No reported side effects. No symptoms noted. He is without vision changes or paraesthesias. His main concern is being able to go back to work. 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 Unclear if they are looking to achieve a specific A1c. We talked about getting additional data with glucose monitoring which they may accept.  History obtained from: patient    Review of Systems   Constitutional:  Negative for chills and fever.   HENT:  Negative for ear pain and sore throat.    Eyes:  Negative for pain and visual disturbance.   Respiratory:  Negative for cough and shortness of breath.    Cardiovascular:  Negative for chest pain and palpitations.   Gastrointestinal:  Negative for abdominal pain and vomiting.   Genitourinary:  Negative for dysuria and hematuria.   Musculoskeletal:  Negative for arthralgias and back pain.   Skin:  Negative for color change and rash.   Neurological:  Negative for seizures and syncope.   All other systems reviewed and are negative.    Pertinent Medical History      Objective   BP (!) 150/110 (BP Location: Left arm, Patient Position: Sitting, Cuff Size: Large)   Pulse 84   Temp (!) 97 °F (36.1 °C) (Tympanic)   Ht 5' 10.5\" (1.791 m)   Wt 102 kg (225 lb 6.4 oz)   SpO2 97%   BMI 31.88 kg/m²      Physical Exam  Vitals and nursing note reviewed.   Constitutional:       General: He is not in acute " distress.     Appearance: He is well-developed.   HENT:      Head: Normocephalic and atraumatic.   Eyes:      Conjunctiva/sclera: Conjunctivae normal.   Cardiovascular:      Rate and Rhythm: Normal rate and regular rhythm.      Heart sounds: No murmur heard.  Pulmonary:      Effort: Pulmonary effort is normal. No respiratory distress.      Breath sounds: Normal breath sounds.   Abdominal:      Palpations: Abdomen is soft.      Tenderness: There is no abdominal tenderness.   Musculoskeletal:         General: No swelling.      Cervical back: Neck supple.   Skin:     General: Skin is warm and dry.      Capillary Refill: Capillary refill takes less than 2 seconds.   Neurological:      Mental Status: He is alert.   Psychiatric:         Mood and Affect: Mood normal.         Administrative Statements   I have spent a total time of 20 minutes in caring for this patient on the day of the visit/encounter including Instructions for management, Patient and family education, and Importance of tx compliance.

## 2025-01-03 NOTE — ASSESSMENT & PLAN NOTE
Lab Results   Component Value Date    HGBA1C 11.0 (A) 01/02/2025     Current medical management includes metformin  mg daily and glipizide 5 mg daily with breakfast.    Initially patient was started on metformin immediate release and did not tolerated due to GI side effects.    He also had been regularly exercising but due to his work however constrictions has been exercising less than usual.    He was recently seen in our clinic and was started on extended release metformin 500 mg daily and/5 mg daily and is tolerating these medications well.    A1c was repeated after 2 weeks which showed some reduction to 11 however this lab value is clinically nonsignificant given the A1c needs to be checked every 3 months.    He continues to report being unable to return to work given elevation of his A1c.    We talked about glucose monitor to get additional data points which his work clinic and may accept.    We also discussed pursuing diabetes education and continuous glucose monitors.  Given the A1c of 11 and recent medication changes, we recommend rechecking around March.  Also discussed need for eye exam and frequent monitoring of foot wounds.    Orders:    Blood Glucose Monitoring Suppl (OneTouch Verio Flex System) TRISHA; Use to test twice daily.    Ambulatory Referral to Diabetic Education; Future

## 2025-01-03 NOTE — ASSESSMENT & PLAN NOTE
Blood pressures at home have been well-controlled  His blood pressure was elevated today in the office however he has not taken his medication yet.  Contributing factors include lifestyle modification with diet and exercise.  Current medical regimen includes amlodipine 5 mg daily and lisinopril 20 mg daily  We recommend continuing current medical regimen  Would advise that  continue to monitor BP at home.

## 2025-06-03 ENCOUNTER — VBI (OUTPATIENT)
Dept: ADMINISTRATIVE | Facility: OTHER | Age: 55
End: 2025-06-03

## 2025-06-03 NOTE — TELEPHONE ENCOUNTER
06/03/25 2:02 PM    Patient was called to schedule a diabetic follow up apointment . Patient will be calling back to set up appointment.    Thank you.  Rere Martinez MA  [facility]